# Patient Record
Sex: FEMALE | Race: WHITE | NOT HISPANIC OR LATINO | Employment: OTHER | URBAN - NONMETROPOLITAN AREA
[De-identification: names, ages, dates, MRNs, and addresses within clinical notes are randomized per-mention and may not be internally consistent; named-entity substitution may affect disease eponyms.]

---

## 2023-11-16 PROCEDURE — 93306 TTE W/DOPPLER COMPLETE: CPT | Performed by: INTERNAL MEDICINE

## 2023-11-20 ENCOUNTER — TELEPHONE (OUTPATIENT)
Dept: CARDIOLOGY | Facility: CLINIC | Age: 73
End: 2023-11-20
Payer: MEDICARE

## 2023-12-13 ENCOUNTER — TELEPHONE (OUTPATIENT)
Dept: CARDIOLOGY | Facility: CLINIC | Age: 73
End: 2023-12-13

## 2023-12-13 ENCOUNTER — OFFICE VISIT (OUTPATIENT)
Dept: CARDIOLOGY | Facility: CLINIC | Age: 73
End: 2023-12-13
Payer: MEDICARE

## 2023-12-13 VITALS
SYSTOLIC BLOOD PRESSURE: 180 MMHG | HEART RATE: 77 BPM | BODY MASS INDEX: 24.27 KG/M2 | HEIGHT: 63 IN | DIASTOLIC BLOOD PRESSURE: 80 MMHG | WEIGHT: 137 LBS

## 2023-12-13 DIAGNOSIS — Z87.74 STATUS POST AORTIC COARCTATION STENT PLACEMENT: ICD-10-CM

## 2023-12-13 DIAGNOSIS — I10 ESSENTIAL HYPERTENSION: ICD-10-CM

## 2023-12-13 DIAGNOSIS — Z95.828 STATUS POST AORTIC COARCTATION STENT PLACEMENT: ICD-10-CM

## 2023-12-13 DIAGNOSIS — R42 DIZZINESS: Primary | ICD-10-CM

## 2023-12-13 DIAGNOSIS — R07.9 CHEST PAIN, UNSPECIFIED TYPE: ICD-10-CM

## 2023-12-13 DIAGNOSIS — I35.0 AORTIC VALVE STENOSIS, ETIOLOGY OF CARDIAC VALVE DISEASE UNSPECIFIED: ICD-10-CM

## 2023-12-13 DIAGNOSIS — R42 DIZZINESS: ICD-10-CM

## 2023-12-13 PROCEDURE — 1159F MED LIST DOCD IN RCRD: CPT | Performed by: INTERNAL MEDICINE

## 2023-12-13 PROCEDURE — 3079F DIAST BP 80-89 MM HG: CPT | Performed by: INTERNAL MEDICINE

## 2023-12-13 PROCEDURE — 93000 ELECTROCARDIOGRAM COMPLETE: CPT | Performed by: INTERNAL MEDICINE

## 2023-12-13 PROCEDURE — 99205 OFFICE O/P NEW HI 60 MIN: CPT | Performed by: INTERNAL MEDICINE

## 2023-12-13 PROCEDURE — 1036F TOBACCO NON-USER: CPT | Performed by: INTERNAL MEDICINE

## 2023-12-13 PROCEDURE — 1160F RVW MEDS BY RX/DR IN RCRD: CPT | Performed by: INTERNAL MEDICINE

## 2023-12-13 PROCEDURE — 3077F SYST BP >= 140 MM HG: CPT | Performed by: INTERNAL MEDICINE

## 2023-12-13 RX ORDER — DICYCLOMINE HYDROCHLORIDE 20 MG/1
20 TABLET ORAL 2 TIMES DAILY PRN
COMMUNITY

## 2023-12-13 RX ORDER — METOPROLOL SUCCINATE 25 MG/1
25 TABLET, EXTENDED RELEASE ORAL DAILY
Qty: 30 TABLET | Refills: 11 | Status: SHIPPED | OUTPATIENT
Start: 2023-12-13 | End: 2024-12-12

## 2023-12-13 RX ORDER — LEVOTHYROXINE SODIUM 100 UG/1
100 TABLET ORAL
COMMUNITY

## 2023-12-13 RX ORDER — LISINOPRIL AND HYDROCHLOROTHIAZIDE 12.5; 2 MG/1; MG/1
1 TABLET ORAL DAILY
Status: ON HOLD | COMMUNITY
End: 2024-02-14 | Stop reason: SDUPTHER

## 2023-12-13 RX ORDER — FERROUS SULFATE 325(65) MG
2 TABLET, DELAYED RELEASE (ENTERIC COATED) ORAL DAILY
COMMUNITY

## 2023-12-13 ASSESSMENT — ENCOUNTER SYMPTOMS
LIGHT-HEADEDNESS: 1
LOSS OF BALANCE: 1
DIZZINESS: 1

## 2023-12-13 NOTE — PATIENT INSTRUCTIONS
Please bring all medicines, vitamins, and herbal supplements with you when you come to the office.    Prescriptions will not be filled unless you are compliant with your follow up appointments or have a follow up appointment scheduled as per instruction of your physician. Refills should be requested at the time of your visit.  Patient to be scheduled for Cardiac Cath with Dr. Ruben Pimentel, DO

## 2023-12-13 NOTE — PROGRESS NOTES
Cardiology Consultation- New Consult    Reason for referral: 73-year-old female seen in cardiology consultation at request of primary care nurse practitioner for abnormal echocardiogram symptoms of dizziness and lightheadedness and near syncope.  Patient had Lexiscan stress imaging and echocardiography performed at [facility], imaging is reviewed revealing severe aortic valve stenosis with peak aortic valve velocity of 4.08 m/s    Patient has 1 year history of increasing dizziness and unsteady gait and orthostatic changes with no true syncope.  She does describe episodic chest discomfort necessitating stress imaging that was normal    She denies any previous myocardial infarction, revascularization, stroke, thromboembolic or bleeding disorder.    Today's ECG is normal with normal sinus rhythm    She is treated for hypertension with 20/12.5 mg lisinopril/hydrochlorothiazide her blood pressure today is elevated at 180/80 and a heart rate of 77-80    Risks, benefits and alternatives and informed decision-making process performed for 30 minutes this morning in regards to aortic stenosis, inclusion criteria for TAVR/TAVR assessment and referral as well as the need for ruling out coronary artery disease by means of cardiac catheterization.    She has an excellent right radial pulse, we will proceed with left heart catheterization electively and then referral to structural heart team for further consideration of elective TAVR.    HPI: Neha Stroud is a 73 y.o. female       Past Medical History:   She has no past medical history on file.    Surgical History:   She has a past surgical history that includes Partial hysterectomy; Radical hysterectomy; Total knee arthroplasty (Bilateral); Rotator cuff repair (Right); Tonsillectomy; and Colonoscopy.    Family History:   Family History   Problem Relation Name Age of Onset    Heart disease Mother      Hypertension Mother      Lung cancer Father      Breast cancer Sister    "   Dementia Sister      Peripheral vascular disease Sister      Cervical cancer Sister      Atrial fibrillation Sister      Stroke Sister      Atrial fibrillation Brother      Throat cancer Brother         Social History:   Social History     Tobacco Use    Smoking status: Never    Smokeless tobacco: Never   Substance Use Topics    Alcohol use: Yes     Comment: occassionally        Allergies:  Patient has no known allergies.     Current Medications:    Current Outpatient Medications:     dicyclomine (Bentyl) 20 mg tablet, Take 1 tablet (20 mg) by mouth 4 times a day before meals., Disp: , Rfl:     ferrous sulfate 325 (65 Fe) MG EC tablet, Take 2 tablets by mouth once daily. Do not crush, chew, or split., Disp: , Rfl:     levothyroxine (Synthroid, Levoxyl) 100 mcg tablet, Take 1 tablet (100 mcg) by mouth once daily in the morning. Take before meals., Disp: , Rfl:     lisinopriL-hydrochlorothiazide 20-12.5 mg tablet, Take 1 tablet by mouth once daily., Disp: , Rfl:      Vitals:  Visit Vitals  /80 (BP Location: Right arm, Patient Position: Sitting)   Pulse 77   Ht 1.588 m (5' 2.5\")   Wt 62.1 kg (137 lb)   BMI 24.66 kg/m²   Smoking Status Never   BSA 1.66 m²     EKG done in office today      Review of Systems   Cardiovascular:  Positive for chest pain.   Neurological:  Positive for dizziness, light-headedness and loss of balance.   All other systems reviewed and are negative.      Objective         Physical Exam  Constitutional:       Appearance: Normal appearance. She is normal weight.   HENT:      Nose: Nose normal.   Neck:      Vascular: No carotid bruit.   Cardiovascular:      Rate and Rhythm: Normal rate.      Pulses: Normal pulses.      Heart sounds: Normal heart sounds.   Pulmonary:      Effort: Pulmonary effort is normal.   Abdominal:      General: Bowel sounds are normal.      Palpations: Abdomen is soft.   Genitourinary:     Rectum: Normal.   Musculoskeletal:         General: Normal range of motion.      " Cervical back: Normal range of motion.      Right lower leg: No edema.      Left lower leg: No edema.   Skin:     General: Skin is warm and dry.   Neurological:      General: No focal deficit present.      Mental Status: She is alert.   Psychiatric:         Mood and Affect: Mood normal.         Behavior: Behavior normal.         Thought Content: Thought content normal.         Judgment: Judgment normal.                Assessment and Plan:   1. Status post aortic coarctation stent placement        2. Chest pain, unspecified type        3. Essential hypertension        4. Dizziness        5. Aortic valve stenosis, etiology of cardiac valve disease unspecified           Scribe Attestation  By signing my name below, Maria R CARTER LPN , Scribe   attest that this documentation has been prepared under the direction and in the presence of Ruben Pimentel DO.

## 2023-12-15 DIAGNOSIS — E78.2 MIXED HYPERLIPIDEMIA: ICD-10-CM

## 2023-12-15 LAB
NON-UH HIE ANION GAP: 13.4 (ref 6–15)
NON-UH HIE BASOPHILS # (AUTO): 0.1 10*3/UL (ref 0–0.2)
NON-UH HIE BASOPHILS % (AUTO): 1.2 %
NON-UH HIE BLOOD UREA NITROGEN: 22 MG/DL (ref 7–25)
NON-UH HIE CARBON DIOXIDE: 23.9 MMOL/L (ref 21–31)
NON-UH HIE CHLORIDE: 98 MMOL/L (ref 98–107)
NON-UH HIE CHOL/HDL RATIO: 3.8
NON-UH HIE CHOLESTEROL: 255 MG/DL (ref 140–200)
NON-UH HIE CREATININE: 1.26 MG/DL (ref 0.6–1.2)
NON-UH HIE EOSINOPHILS # (AUTO): 0.4 10*3/UL (ref 0–0.45)
NON-UH HIE EOSINOPHILS % (AUTO): 6.9 %
NON-UH HIE ESTIMATED GFR: 44.52
NON-UH HIE HDL CHOLESTEROL: 68 MG/DL (ref 23–92)
NON-UH HIE HEMATOCRIT: 39 % (ref 34–46.4)
NON-UH HIE HEMOGLOBIN: 13.2 G/DL (ref 11.8–15.4)
NON-UH HIE INR: 0.9
NON-UH HIE LDL CHOLESTEROL,CALCULATED: 170 MG/DL (ref 0–100)
NON-UH HIE LYMPHOCYTES # (AUTO): 1.1 10*3/UL (ref 1–4.8)
NON-UH HIE LYMPHOCYTES % (AUTO): 19.2 %
NON-UH HIE MEAN CORPUSCULAR HEMOGLOBIN: 30.1 PG (ref 24.7–34.3)
NON-UH HIE MEAN CORPUSCULAR HGB CONC: 33.8 G/DL (ref 32–35)
NON-UH HIE MEAN CORPUSCULAR VOLUME: 88.8 FL (ref 80–100)
NON-UH HIE MEAN PLATELET VOLUME: 7.1 FL (ref 6.3–10.7)
NON-UH HIE MONOCYTES # (AUTO): 0.4 10*3/UL (ref 0–0.8)
NON-UH HIE MONOCYTES % (AUTO): 6.4 %
NON-UH HIE NEUTROPHILS # (AUTO): 3.7 10*3/UL (ref 1.8–7.7)
NON-UH HIE NEUTROPHILS % (AUTO): 66.3 %
NON-UH HIE NRBC%: 0 /100{WBC} (ref 0–0.5)
NON-UH HIE PARTIAL THROMBOPLASTIN TIME: 31.1 S (ref 25.1–36.5)
NON-UH HIE PLATELET COUNT: 293 10*3/UL (ref 150–450)
NON-UH HIE POTASSIUM: 4.3 MMOL/L (ref 3.5–5.1)
NON-UH HIE PROTHROMBIN TIME: 11 S (ref 9–12.9)
NON-UH HIE RED BLOOD COUNT: 4.39 (ref 3.6–5)
NON-UH HIE RED CELL DISTRIBUTION WIDTH: 13.7 % (ref 11.9–15.3)
NON-UH HIE SODIUM: 131 MMOL/L (ref 136–145)
NON-UH HIE TRIGLYCERIDE W/REFLEX: 84 MG/DL (ref 0–149)
NON-UH HIE UNCORRECTED WBC: 5.6 10*3/UL (ref 3.8–11.6)
NON-UH HIE VLDL CHOLESTEROL: 16 MG/DL
NON-UH HIE WHITE BLOOD COUNT: 5.6 10*3/UL (ref 3.8–11.6)

## 2023-12-15 RX ORDER — ATORVASTATIN CALCIUM 40 MG/1
40 TABLET, FILM COATED ORAL DAILY
Qty: 90 TABLET | Refills: 3 | Status: SHIPPED | OUTPATIENT
Start: 2023-12-15 | End: 2024-12-14

## 2023-12-15 NOTE — TELEPHONE ENCOUNTER
Result Communication    Resulted Orders   [facility] Complete Blood Count Auto Diff   Result Value Ref Range    [facility] White Blood Count 5.6 3.8 - 11.6 10*3/uL    [facility] Uncorrected WBC 5.6 3.8 - 11.6 10*3/uL    [facility] Red Blood Count 4.39 3.60 - 5.00    [facility] Hemoglobin 13.2 11.8 - 15.4 g/dL    [facility] Hematocrit 39.0 34.0 - 46.4 %    [facility] Mean Corpuscular Volume 88.8 80 - 100 fL    [facility] Mean Corpuscular Hemoglobin 30.1 24.7 - 34.3 pg    [facility] Mean Corpuscular HGB Conc 33.8 32.0 - 35.0 g/dL    [facility] Red Cell Distribution Width 13.7 11.9 - 15.3 %    [facility] Platelet Count 293 150 - 450 10*3/uL    [facility] Mean Platelet Volume 7.1 6.3 - 10.7 fL    [facility] Neutrophils % (Auto) 66.3 . %    [facility] Lymphocytes % (Auto) 19.2 . %    [facility] Monocytes % (Auto) 6.4 . %    [facility] Eosinophils % (Auto) 6.9 . %    [facility] Basophils % (Auto) 1.2 . %    [facility] NRBC% 0.0 0 - 0.5 /100[WBC]    [facility] Neutrophils # (Auto) 3.7 1.8 - 7.7 10*3/uL    [facility] Lymphocytes # (Auto) 1.1 1.00 - 4.8 10*3/uL    [facility] Monocytes # (Auto) 0.4 0.0 - 0.8 10*3/uL    [facility] Eosinophils # (Auto) 0.4 0.0 - 0.45 10*3/uL    [facility] Basophils # (Auto) 0.1 0.0 - 0.2 10*3/uL      Comment:      PERFORMED BY:[place] [id number] [place], [place] [id number] MEDICAL DIRECTORJIANLAN SUN M.D.   [facility] Coagulation Profile   Result Value Ref Range    [facility] Prothrombin Time 11.0 9.0 - 12.9 s      Comment:         A hematocrit value greater than 55% may lead to inaccurate   results in coagulation testing. Patients having hematocrit   values >55% require a special collection tube for   coagulation studies.   Please contact the laboratory at [phone] for redraw   instructions.    [facility] INR 0.9       Comment:         INR Therapeutic Range   A) Pre- and Peroperative OAT started two weeks before   surgery.                     NOT  HIP SURGERY:   1.5 - 2.5                                HIP SURGERY:        2  -  3   B) Primary and secondary prevention of venous                                THROMBOSIS:         2  -  3   C) Active venous thrombosis, pulmonary embolism   and prevention of recurrent venous thrombosis:   2  -  3      D) Prevention of arterial thromboembolism   including patients with mechanical heart valves: 3  - 4.5    [facility] Partial Thromboplastin Time 31.1 25.1 - 36.5 s      Comment:         A hematocrit value greater than 55% may lead to inaccurate   results in coagulation testing. Patients having hematocrit   values >55% require a special collection tube for   coagulation studies.   Please contact the laboratory at [phone] for redraw   instructions.PERFORMED BY:[place] [id number] [place], [place] [id number] MEDICAL DIRECTORKAMI RETANA M.D.   [facility] Electrolytes   Result Value Ref Range    [facility] Sodium 131 (L) 136 - 145 mmol/L    [facility] Potassium 4.3 3.5 - 5.1 mmol/L    [facility] Chloride 98 98 - 107 mmol/L    [facility] Carbon Dioxide 23.9 21.0 - 31.0 mmol/L    [facility] Anion Gap 13.4 6.0 - 15.0   [facility] Blood Urea Nitrogen   Result Value Ref Range    [facility] Blood Urea Nitrogen 22 7 - 25 mg/dL   [facility] Creatinine   Result Value Ref Range    [facility] Creatinine 1.26 (H) 0.60 - 1.20 mg/dL    [facility] ESTIMATED GFR 44.522    [facility] Lipid Panel   Result Value Ref Range    [facility] Cholesterol 255 (H) 140 - 200 mg/dL      Comment:         Chol less than 200 mg/dl      low risk   Chol 201-239 mg/dl            borderline risk   Chol 240 mg/dl and greater    high risk    [facility] HDL Cholesterol 68 23 - 92 mg/dL      Comment:         HDL CHOL ATP-III CLASSIFICATION                              Cardiovascular                                  Risk      HDL > or equal to 60 mg/dL     LOW   HDL < 40 mg/dL                 HIGH    [facility]  Triglyceride w/Reflex 84 0 - 149 mg/dL      Comment:         TRIG ATP III CLASSIFICATION   TRIG less than 150 mg/dL      Normal   TRIG 150-199 mg/dL            Borderline high   TRIG 200-500 mg/dL            High   TRIG greater than 500 mg/dL   Very high   Standard traceable to the Center for Disease Conrtrol and   Prevention (CDC) test method.    [facility] LDL Cholesterol,Calculated 170 (H) 0 - 100 mg/dL      Comment:         LDL ATP III CLASSIFICATION   LDL less than 100 mg/dL       Optimal   -129 mg/dL             Near or above optimal   -159 mg/dL             Borderline high   -189 mg/dL             High   LDL greater than 189 mg/dL    Very high    [facility] VLDL CHOLESTEROL 16 mg/dL    [facility] Chol/HDL Ratio 3.8 <5.0      Comment:      PERFORMED BY:[place] [id number] [place], [place] [id number] MEDICAL DIRECTORKAMI RETANA M.D.       4:25 PM      Results were successfully communicated with the patient and they acknowledged their understanding.

## 2023-12-15 NOTE — TELEPHONE ENCOUNTER
----- Message from Ruben Pimentel DO sent at 12/15/2023  3:42 PM EST -----  , initiate atorvastatin 40 daily please

## 2024-01-09 ENCOUNTER — TELEPHONE (OUTPATIENT)
Dept: CARDIOLOGY | Facility: CLINIC | Age: 74
End: 2024-01-09
Payer: MEDICARE

## 2024-01-09 NOTE — TELEPHONE ENCOUNTER
Patient called stating she passed out  while taking a shower today. States she had nausea prior to the occurrence.  Patient states only known injury is she did twist her knee.  She is waiting to hear from  regarding referral. She has not heard. Heart cath was mentioned at 12/13/2023 visit via dictation.     Reccommended she report to er for the occurrence of  of syncope.     To Dr. Jose Dougherty MD in Dr. Ruben Pimentel, DO absence.

## 2024-01-10 ENCOUNTER — TELEPHONE (OUTPATIENT)
Dept: CARDIOLOGY | Facility: CLINIC | Age: 74
End: 2024-01-10

## 2024-01-10 DIAGNOSIS — I35.0 NONRHEUMATIC AORTIC VALVE STENOSIS: ICD-10-CM

## 2024-01-10 NOTE — TELEPHONE ENCOUNTER
Patient had PCI on 12/18 with Dr. Ruben Pimentel DO and order was placed for referral. See new task.

## 2024-01-10 NOTE — TELEPHONE ENCOUNTER
Patient is being discharged from [facility] s/p cath on 12/18 structrual heart referral for TAVR with Dr. Newberry  Patient is aware referral being made.     Transferred to scheduling  to send to Structural Heart for TAVR once order signed.

## 2024-01-11 ENCOUNTER — TELEPHONE (OUTPATIENT)
Dept: CARDIOLOGY | Facility: HOSPITAL | Age: 74
End: 2024-01-11
Payer: MEDICARE

## 2024-01-11 NOTE — TELEPHONE ENCOUNTER
Received Structural Heart referral today for aortic stenosis. Called patient & left message to call office back.

## 2024-01-12 ENCOUNTER — TELEPHONE (OUTPATIENT)
Dept: CARDIOLOGY | Facility: HOSPITAL | Age: 74
End: 2024-01-12
Payer: MEDICARE

## 2024-01-17 DIAGNOSIS — I35.0 NONRHEUMATIC AORTIC VALVE STENOSIS: Primary | ICD-10-CM

## 2024-01-22 ENCOUNTER — OFFICE VISIT (OUTPATIENT)
Dept: CARDIAC SURGERY | Facility: HOSPITAL | Age: 74
End: 2024-01-22
Payer: MEDICARE

## 2024-01-22 ENCOUNTER — HOSPITAL ENCOUNTER (OUTPATIENT)
Dept: RADIOLOGY | Facility: HOSPITAL | Age: 74
Discharge: HOME | End: 2024-01-22
Payer: MEDICARE

## 2024-01-22 ENCOUNTER — OFFICE VISIT (OUTPATIENT)
Dept: CARDIOLOGY | Facility: HOSPITAL | Age: 74
End: 2024-01-22
Payer: MEDICARE

## 2024-01-22 VITALS
SYSTOLIC BLOOD PRESSURE: 130 MMHG | HEIGHT: 62 IN | OXYGEN SATURATION: 98 % | BODY MASS INDEX: 25.03 KG/M2 | WEIGHT: 136 LBS | DIASTOLIC BLOOD PRESSURE: 78 MMHG

## 2024-01-22 DIAGNOSIS — I35.0 NONRHEUMATIC AORTIC VALVE STENOSIS: ICD-10-CM

## 2024-01-22 DIAGNOSIS — I35.0 NONRHEUMATIC AORTIC VALVE STENOSIS: Primary | ICD-10-CM

## 2024-01-22 PROCEDURE — 1036F TOBACCO NON-USER: CPT | Performed by: THORACIC SURGERY (CARDIOTHORACIC VASCULAR SURGERY)

## 2024-01-22 PROCEDURE — 99214 OFFICE O/P EST MOD 30 MIN: CPT | Performed by: INTERNAL MEDICINE

## 2024-01-22 PROCEDURE — 3075F SYST BP GE 130 - 139MM HG: CPT | Performed by: INTERNAL MEDICINE

## 2024-01-22 PROCEDURE — 1159F MED LIST DOCD IN RCRD: CPT | Performed by: INTERNAL MEDICINE

## 2024-01-22 PROCEDURE — 1126F AMNT PAIN NOTED NONE PRSNT: CPT | Performed by: THORACIC SURGERY (CARDIOTHORACIC VASCULAR SURGERY)

## 2024-01-22 PROCEDURE — 71275 CT ANGIOGRAPHY CHEST: CPT | Performed by: STUDENT IN AN ORGANIZED HEALTH CARE EDUCATION/TRAINING PROGRAM

## 2024-01-22 PROCEDURE — 1036F TOBACCO NON-USER: CPT | Performed by: INTERNAL MEDICINE

## 2024-01-22 PROCEDURE — 2550000001 HC RX 255 CONTRASTS: Performed by: INTERNAL MEDICINE

## 2024-01-22 PROCEDURE — 99205 OFFICE O/P NEW HI 60 MIN: CPT | Performed by: THORACIC SURGERY (CARDIOTHORACIC VASCULAR SURGERY)

## 2024-01-22 PROCEDURE — 3078F DIAST BP <80 MM HG: CPT | Performed by: INTERNAL MEDICINE

## 2024-01-22 PROCEDURE — 1159F MED LIST DOCD IN RCRD: CPT | Performed by: THORACIC SURGERY (CARDIOTHORACIC VASCULAR SURGERY)

## 2024-01-22 PROCEDURE — 1126F AMNT PAIN NOTED NONE PRSNT: CPT | Performed by: INTERNAL MEDICINE

## 2024-01-22 PROCEDURE — 99204 OFFICE O/P NEW MOD 45 MIN: CPT | Performed by: INTERNAL MEDICINE

## 2024-01-22 PROCEDURE — 1160F RVW MEDS BY RX/DR IN RCRD: CPT | Performed by: THORACIC SURGERY (CARDIOTHORACIC VASCULAR SURGERY)

## 2024-01-22 PROCEDURE — 74174 CTA ABD&PLVS W/CONTRAST: CPT

## 2024-01-22 PROCEDURE — 99215 OFFICE O/P EST HI 40 MIN: CPT | Mod: 57 | Performed by: THORACIC SURGERY (CARDIOTHORACIC VASCULAR SURGERY)

## 2024-01-22 PROCEDURE — 74174 CTA ABD&PLVS W/CONTRAST: CPT | Performed by: STUDENT IN AN ORGANIZED HEALTH CARE EDUCATION/TRAINING PROGRAM

## 2024-01-22 RX ADMIN — IOHEXOL 80 ML: 350 INJECTION, SOLUTION INTRAVENOUS at 11:26

## 2024-01-22 ASSESSMENT — PAIN SCALES - GENERAL: PAINLEVEL: 0-NO PAIN

## 2024-01-22 NOTE — LETTER
January 22, 2024     Jose Dougherty MD  [address]  [facility] [address]    Patient: Neha Stroud   YOB: 1950   Date of Visit: 1/22/2024       Dear Dr. Jose Dougherty MD:    Thank you for referring Neha Stroud to me for evaluation. Below are my notes for this consultation.  If you have questions, please do not hesitate to call me. I look forward to following your patient along with you.       Sincerely,     Jeff Berry MD      CC: Loree Salmon, APRN-CNP  ______________________________________________________________________________________    Cardio: Farhat      HPI:   73-year-old lady with past medical history of CAD with PCI to RCA (12/23), hypertension, dyslipidemia, CKD and anemia.    The patient has progressive NYHA class II symptoms and fatigue.  Her echocardiogram shows severe aortic stenosis EF 60 to 65%, PV 4.08, MG 35, CONTRERAS 0.7.  The patient also had an episode of syncope while taking a shower last week for which she went to [facility].  She does report dizziness mostly while taking a shower but not necessarily with exertion.  She no longer has chest pain since she had the PCI.      Patient is independent in ADLs and IADLs, she does not use any assistance while walking.  She is referred to our clinic for severe symptomatic aortic stenosis          ROS:    Constitutional: fatigue  Eyes: no eyesight problems, no blurred vision, no diplopia, no eye pain, no purulent discharge from the eyes, eyes not red, no dryness of the eyes and no itching of the eyes.   ENT: no nosebleeds, no hearing loss, no tinnitus, no earache, no sore throat, no hoarseness, no swollen glands in the neck and no nasal discharge.   Cardiovascular: dyspnea on exertion, no chest pain  Respiratory: no chronic cough, not coughing up sputum, no wheezing that is consistent with asthma  Gastrointestinal: no change in bowel habits, no blood in stools, no diarrhea, no constipation, no  nausea, no vomiting, no abdominal pain, no signs and symptoms of ulcer disease, no alex colored stools and no intolerance to fatty foods.   Genitourinary: no hematuria,  no urinary frequency, no dysuria, no incontinence, no burning sensation during urination, no urinary hesitancy, no nocturia, no genital lesion, no testicular pain, urinary stream is not smaller and urinary stream does not start and stop.   Musculoskeletal: no arthralgias, no myalgias, no joint swelling, no joint stiffness, no muscle weakness, no back pain, no limb pain, no limb swelling and no difficulty walking.   Skin: no skin rashes, no change in skin color and pigmentation, no skin lesions and no skin lumps.   Neurological: no seizures, no frequent falls, no headaches, no dizziness, no tingling, no fainting and no limb weakness.   Psychiatric: no depression, not suicidal, no confusion, no memory lapses or loss, no anxiety, no personality change and no emotional problems.   Endocrine: no goiter, no thyroid disorder, no diabetes mellitus, no excessive thirst, no dry skin, no cold intolerance, no heat intolerance, no erectile dysfunction, no increased urinary frequency, no proptosis and no deepening of the voice.   Hematologic/Lymphatic: no bleeding issues.   All other systems have been reviewed and are negative for complaint.       TAVR Workup:   - NYHA: II  - Frailty: 0/5  - EKG: NSR, , QRS 96  - TTE: EF 60%, PV 4.08, MG 35, CONTRERAS 0.7  - CT TAVR: Pending  - LHC: 12/23 PCI proximal/mid RCA  - dental clearance: No reported dental issues, sees dentist every 6 months  - STS  Procedure Type: Isolated AVR  Perioperative Outcome Estimate %  Operative Mortality 2.13%  Morbidity & Mortality 6.28%  Stroke 0.936%  Renal Failure 1.06%  Reoperation 3.13%  Prolonged Ventilation 3.32%  Deep Sternal Wound Infection 0.044%  Long Hospital Stay (>14 days) 2.77%  Short Hospital Stay (<6 days)* 53.6%    Physical Exam:  Constitutional: alert and in no acute  "distress.   Eyes: no erythema, swelling or discharge from the eye .   ENT: no erythema, edema, exudate or lesions .   Neck: neck is supple, no JVD  Pulmonary: no increased work of breathing or signs of respiratory distress , lungs clear to auscultation. , normal percussion of chest  and chest palpation normal .   Cardiovascular: RRR, 3/6 GILDA RUSB,  no pitting edema, non-displaced PMI, no S3 or S4  Abdomen: abdomen non-tender, no masses  and no hepatomegaly .   Neurologic: non-focal neurologic examination.           12/13/2023    10:20 AM 12/13/2023    10:21 AM   Vitals   Systolic 184 180   Diastolic 102 80   Heart Rate 77    Height (in) 1.588 m (5' 2.5\")    Weight (lb) 137    BMI 24.66 kg/m2    BSA (m2) 1.65 m2    Visit Report Report Report        Current Outpatient Medications   Medication Instructions   • atorvastatin (LIPITOR) 40 mg, oral, Daily   • dicyclomine (BENTYL) 20 mg, oral, 4 times daily before meals and nightly   • ferrous sulfate 325 (65 Fe) MG EC tablet 2 tablets, oral, Daily, Do not crush, chew, or split.   • levothyroxine (SYNTHROID, LEVOXYL) 100 mcg, oral, Daily before breakfast   • lisinopriL-hydrochlorothiazide 20-12.5 mg tablet 1 tablet, oral, Daily   • metoprolol succinate XL (TOPROL-XL) 25 mg, oral, Daily, Do not crush or chew.        Impression:   73-year-old lady with past medical history of CAD with PCI to RCA (12/23), hypertension, dyslipidemia, CKD and anemia.    The patient has severe symptomatic aortic stenosis with class II heart failure symptoms.  She is referred to our clinic for further evaluation and treatment planning    Plan:   Proceed with CT TAVR to assess her anatomy    Overall patient might be a candidate for both SAVR versus TAVR      We will discuss this patient's case at our Valve Team meeting with representatives from Structural Heart and Cardiac Surgery. Our nurse navigators will contact patient with further diagnostic needs and formal plan.       Attestation signed by " Jeff Berry MD at 1/22/2024  4:58 PM:  I saw and evaluated the patient. I personally obtained the key and critical portions of the history and physical exam or was physically present for key and critical portions performed by the resident/fellow. I reviewed the resident/fellow's documentation and discussed the patient with the resident/fellow. I agree with the resident/fellow's medical decision making as documented in the note.    Mrs. Stroud is an 73 year old woman who is a patient of [facility]; Dr. Jose Dougherty is her cardiologist.  I have been asked to evaluate her for aortic stenosis.  Her symptoms include dyspnea as well as fatigue.  She had an episode of syncope in the shower last week.  She denies chest pain since her recent PCI.  Her imaging is consistent with severe aortic stenosis with an ejection fraction of 60-65%, aortic valve velocity of 4.0 m/s, and aortic valve area calculated 0.7 cm², mean aortic valve gradient of 35 mmHg.    We had a nice discussion regarding the indications for intervention on her valve disease, this included percutaneous as well as open surgical approaches.  Certainly a catheter-based approach is first-line therapy and in her best interest.  We discussed the specific goals of this procedure being relief of symptoms, preservation of left ventricular function, and prolonging life.  Specific risks discussed were vascular access site bleeding/complication, need for permanent pacemaker, and stroke.     In further evaluation we are obtaining a computed tomography-TAVR protocol.    She is a normal risk surgical patient, should there be any difficulty with vascular access, or aortic root anatomy/geometry she will be certainly an candidate for open cardiac surgery to replace her aortic valve.

## 2024-01-22 NOTE — PROGRESS NOTES
Cardio: Farhat      HPI:   73-year-old lady with past medical history of CAD with PCI to RCA (12/23), hypertension, dyslipidemia, CKD and anemia.    The patient has progressive NYHA class II symptoms and fatigue.  Her echocardiogram shows severe aortic stenosis EF 60 to 65%, PV 4.08, MG 35, CONTRERAS 0.7.  The patient also had an episode of syncope while taking a shower last week for which she went to [facility].  She does report dizziness mostly while taking a shower but not necessarily with exertion.  She no longer has chest pain since she had the PCI.      Patient is independent in ADLs and IADLs, she does not use any assistance while walking.  She is referred to our clinic for severe symptomatic aortic stenosis          ROS:    Constitutional: fatigue  Eyes: no eyesight problems, no blurred vision, no diplopia, no eye pain, no purulent discharge from the eyes, eyes not red, no dryness of the eyes and no itching of the eyes.   ENT: no nosebleeds, no hearing loss, no tinnitus, no earache, no sore throat, no hoarseness, no swollen glands in the neck and no nasal discharge.   Cardiovascular: dyspnea on exertion, no chest pain  Respiratory: no chronic cough, not coughing up sputum, no wheezing that is consistent with asthma  Gastrointestinal: no change in bowel habits, no blood in stools, no diarrhea, no constipation, no nausea, no vomiting, no abdominal pain, no signs and symptoms of ulcer disease, no alex colored stools and no intolerance to fatty foods.   Genitourinary: no hematuria,  no urinary frequency, no dysuria, no incontinence, no burning sensation during urination, no urinary hesitancy, no nocturia, no genital lesion, no testicular pain, urinary stream is not smaller and urinary stream does not start and stop.   Musculoskeletal: no arthralgias, no myalgias, no joint swelling, no joint stiffness, no muscle weakness, no back pain, no limb pain, no limb swelling and no difficulty walking.   Skin: no skin  rashes, no change in skin color and pigmentation, no skin lesions and no skin lumps.   Neurological: no seizures, no frequent falls, no headaches, no dizziness, no tingling, no fainting and no limb weakness.   Psychiatric: no depression, not suicidal, no confusion, no memory lapses or loss, no anxiety, no personality change and no emotional problems.   Endocrine: no goiter, no thyroid disorder, no diabetes mellitus, no excessive thirst, no dry skin, no cold intolerance, no heat intolerance, no erectile dysfunction, no increased urinary frequency, no proptosis and no deepening of the voice.   Hematologic/Lymphatic: no bleeding issues.   All other systems have been reviewed and are negative for complaint.       TAVR Workup:   - NYHA: II  - Frailty: 0/5  - EKG: NSR, , QRS 96  - TTE: EF 60%, PV 4.08, MG 35, CONTRERAS 0.7  - CT TAVR: Pending  - LHC: 12/23 PCI proximal/mid RCA  - dental clearance: No reported dental issues, sees dentist every 6 months  - STS  Procedure Type: Isolated AVR  Perioperative Outcome Estimate %  Operative Mortality 2.13%  Morbidity & Mortality 6.28%  Stroke 0.936%  Renal Failure 1.06%  Reoperation 3.13%  Prolonged Ventilation 3.32%  Deep Sternal Wound Infection 0.044%  Long Hospital Stay (>14 days) 2.77%  Short Hospital Stay (<6 days)* 53.6%    Physical Exam:  Constitutional: alert and in no acute distress.   Eyes: no erythema, swelling or discharge from the eye .   ENT: no erythema, edema, exudate or lesions .   Neck: neck is supple, no JVD  Pulmonary: no increased work of breathing or signs of respiratory distress , lungs clear to auscultation. , normal percussion of chest  and chest palpation normal .   Cardiovascular: RRR, 3/6 GILDA RUSB,  no pitting edema, non-displaced PMI, no S3 or S4  Abdomen: abdomen non-tender, no masses  and no hepatomegaly .   Neurologic: non-focal neurologic examination.           12/13/2023    10:20 AM 12/13/2023    10:21 AM   Vitals   Systolic 184 180   Diastolic  "102 80   Heart Rate 77    Height (in) 1.588 m (5' 2.5\")    Weight (lb) 137    BMI 24.66 kg/m2    BSA (m2) 1.65 m2    Visit Report Report Report        Current Outpatient Medications   Medication Instructions    atorvastatin (LIPITOR) 40 mg, oral, Daily    dicyclomine (BENTYL) 20 mg, oral, 4 times daily before meals and nightly    ferrous sulfate 325 (65 Fe) MG EC tablet 2 tablets, oral, Daily, Do not crush, chew, or split.    levothyroxine (SYNTHROID, LEVOXYL) 100 mcg, oral, Daily before breakfast    lisinopriL-hydrochlorothiazide 20-12.5 mg tablet 1 tablet, oral, Daily    metoprolol succinate XL (TOPROL-XL) 25 mg, oral, Daily, Do not crush or chew.        Impression:   73-year-old lady with past medical history of CAD with PCI to RCA (12/23), hypertension, dyslipidemia, CKD and anemia.    The patient has severe symptomatic aortic stenosis with class II heart failure symptoms.  She is referred to our clinic for further evaluation and treatment planning    Plan:   Proceed with CT TAVR to assess her anatomy    Overall patient might be a candidate for both SAVR versus TAVR      We will discuss this patient's case at our Valve Team meeting with representatives from Structural Heart and Cardiac Surgery. Our nurse navigators will contact patient with further diagnostic needs and formal plan.     "

## 2024-01-22 NOTE — PROGRESS NOTES
Cardio: Farhat      HPI:   73-year-old lady with past medical history of CAD with PCI to RCA (12/23), hypertension, dyslipidemia, CKD and anemia.    The patient has progressive NYHA class II symptoms and fatigue.  Her echocardiogram shows severe aortic stenosis EF 60 to 65%, PV 4.08, MG 35, CONTRERAS 0.7.  The patient also had an episode of syncope while taking a shower last week for which she went to [facility].  She does report dizziness mostly while taking a shower but not necessarily with exertion.  She no longer has chest pain since she had the PCI.      Patient is independent in ADLs and IADLs, she does not use any assistance while walking.  She is referred to our clinic for severe symptomatic aortic stenosis          ROS:    Constitutional: fatigue  Eyes: no eyesight problems, no blurred vision, no diplopia, no eye pain, no purulent discharge from the eyes, eyes not red, no dryness of the eyes and no itching of the eyes.   ENT: no nosebleeds, no hearing loss, no tinnitus, no earache, no sore throat, no hoarseness, no swollen glands in the neck and no nasal discharge.   Cardiovascular: dyspnea on exertion, no chest pain  Respiratory: no chronic cough, not coughing up sputum, no wheezing that is consistent with asthma  Gastrointestinal: no change in bowel habits, no blood in stools, no diarrhea, no constipation, no nausea, no vomiting, no abdominal pain, no signs and symptoms of ulcer disease, no alex colored stools and no intolerance to fatty foods.   Genitourinary: no hematuria,  no urinary frequency, no dysuria, no incontinence, no burning sensation during urination, no urinary hesitancy, no nocturia, no genital lesion, no testicular pain, urinary stream is not smaller and urinary stream does not start and stop.   Musculoskeletal: no arthralgias, no myalgias, no joint swelling, no joint stiffness, no muscle weakness, no back pain, no limb pain, no limb swelling and no difficulty walking.   Skin: no skin  I called and s/w pt's mom, ok per RODRIGUEZ, she states pt is now in [place] and went to urgent care to have inhibin B drawn, on day of draw, they were not able to get any blood, advised pt to drink plenty of water and go to [facility] in a couple of days,  They told her she had to have a new lab form and they gave her one but changed the codes to 2 other codes other than the original code we put on lab slip of C56.9. Mother states insurance is not paying for inhibin B like they have in the past.  I advised mom that the urgent care facility that gave her a new lab slip needs to fix the code. It needs to be C56.9. She will call office and have them correct. We also did not receive a copy of lab work, due to md name also was changed. She will call and have them fax over a copy, offered assistance in the future. rashes, no change in skin color and pigmentation, no skin lesions and no skin lumps.   Neurological: no seizures, no frequent falls, no headaches, no dizziness, no tingling, no fainting and no limb weakness.   Psychiatric: no depression, not suicidal, no confusion, no memory lapses or loss, no anxiety, no personality change and no emotional problems.   Endocrine: no goiter, no thyroid disorder, no diabetes mellitus, no excessive thirst, no dry skin, no cold intolerance, no heat intolerance, no erectile dysfunction, no increased urinary frequency, no proptosis and no deepening of the voice.   Hematologic/Lymphatic: no bleeding issues.   All other systems have been reviewed and are negative for complaint.       TAVR Workup:   - NYHA: II  - Frailty: 0/5  - EKG: NSR, , QRS 96  - TTE: EF 60%, PV 4.08, MG 35, CONRTERAS 0.7  - CT TAVR: Pending  - LHC: 12/23 PCI proximal/mid RCA  - dental clearance: No reported dental issues, sees dentist every 6 months  - STS  Procedure Type: Isolated AVR  Perioperative Outcome Estimate %  Operative Mortality 2.13%  Morbidity & Mortality 6.28%  Stroke 0.936%  Renal Failure 1.06%  Reoperation 3.13%  Prolonged Ventilation 3.32%  Deep Sternal Wound Infection 0.044%  Long Hospital Stay (>14 days) 2.77%  Short Hospital Stay (<6 days)* 53.6%    Physical Exam:  Constitutional: alert and in no acute distress.   Eyes: no erythema, swelling or discharge from the eye .   ENT: no erythema, edema, exudate or lesions .   Neck: neck is supple, no JVD  Pulmonary: no increased work of breathing or signs of respiratory distress , lungs clear to auscultation. , normal percussion of chest  and chest palpation normal .   Cardiovascular: RRR, 3/6 GILDA RUSB,  no pitting edema, non-displaced PMI, no S3 or S4  Abdomen: abdomen non-tender, no masses  and no hepatomegaly .   Neurologic: non-focal neurologic examination.           12/13/2023    10:20 AM 12/13/2023    10:21 AM   Vitals   Systolic 184 180   Diastolic  "102 80   Heart Rate 77    Height (in) 1.588 m (5' 2.5\")    Weight (lb) 137    BMI 24.66 kg/m2    BSA (m2) 1.65 m2    Visit Report Report Report        Current Outpatient Medications   Medication Instructions    atorvastatin (LIPITOR) 40 mg, oral, Daily    dicyclomine (BENTYL) 20 mg, oral, 4 times daily before meals and nightly    ferrous sulfate 325 (65 Fe) MG EC tablet 2 tablets, oral, Daily, Do not crush, chew, or split.    levothyroxine (SYNTHROID, LEVOXYL) 100 mcg, oral, Daily before breakfast    lisinopriL-hydrochlorothiazide 20-12.5 mg tablet 1 tablet, oral, Daily    metoprolol succinate XL (TOPROL-XL) 25 mg, oral, Daily, Do not crush or chew.        Impression:   73-year-old lady with past medical history of CAD with PCI to RCA (12/23), hypertension, dyslipidemia, CKD and anemia.    The patient has severe symptomatic aortic stenosis with class II heart failure symptoms.  She is referred to our clinic for further evaluation and treatment planning    Plan:   Proceed with CT TAVR to assess her anatomy    Overall patient might be a candidate for both SAVR versus TAVR      We will discuss this patient's case at our Valve Team meeting with representatives from Structural Heart and Cardiac Surgery. Our nurse navigators will contact patient with further diagnostic needs and formal plan.     "

## 2024-01-22 NOTE — PROGRESS NOTES
[facility]Q Questionnaire      1  Heart failure affects different people in different ways. Some feel shortness of breath while others feel fatigue. Please indicate how much you are limited by heart failure (shortness of breath or fatigue) in your ability to do the following activities over the past 2 weeks. PRE PROCEDURE    A.) Showering/bathing  5. Not at All  B.) Walking 1 block on level ground 3. Moderately  C.) Hurrying or Jogging   4. Slightly    2.  Over the past 2 weeks, how many times did you have swelling in your feet, ankles or legs when you woke up in the morning? 4. Less than once a week    3.  Over the past 2 weeks, on average, how many times has fatigue limited your ability to do what you wanted? 5. 1-2 times a week    4.  Over the past 2 weeks, on average, how many times has shortness of breath limited your ability to do what you wanted? 6. Less than once a week    5.  Over the past 2 weeks, on average, how many times have you been forced to sleep sitting up in a chair or with at least 3 pillows to prop you up because of shortness of breath? Less than once a week    6. Over the past 2 weeks, how much has your heart failure limited your enjoyment of life? It has slightly limited my enjoyment of life    7. If you had to spend the rest of your life with your heart failure the way it is right now, how would you feel about this? 3. Somewhat satisfied    8. How much does your heart failure affect your lifestyle? Please indicate how your heart failure may have limited yourparticipation in the following activities over the past 2 weeks    A.)  Hobbies, recreational activities  3. Moderately limited    B.) Working or doing household chores  3. Moderately limited    C.) Visiting family or friends out of your home  4. Slightly limited    5 Meter Walk______1:56____seconds

## 2024-01-29 ENCOUNTER — CARDIOLOGY CONFERENCE (OUTPATIENT)
Dept: CARDIOLOGY | Facility: HOSPITAL | Age: 74
End: 2024-01-29
Payer: MEDICARE

## 2024-01-29 DIAGNOSIS — I35.0 NONRHEUMATIC AORTIC VALVE STENOSIS: Primary | ICD-10-CM

## 2024-01-29 NOTE — PROGRESS NOTES
Case was reviewed in Valve and Structural heart team meeting. Plan to proceed with TAVR.    Access ok. Melanie [address] area 404  sinus 31/31/31 stj 27 Evolut 29 fx predil 20

## 2024-02-12 PROBLEM — Z95.2 S/P TAVR (TRANSCATHETER AORTIC VALVE REPLACEMENT): Status: ACTIVE | Noted: 2024-02-12

## 2024-02-12 NOTE — DISCHARGE INSTRUCTIONS
Medication changes    1 Reduce your Lisinopril/hydrochlorothiazide to 10/6.25mg Can cut current pill in half  2 Resume all other home meds starting tonight      ####  POST VALVE PROCEDURE DISCHARGE INSTRUCTIONS   ####    - Please weigh yourself daily (first thing in the morning) and record reading  - Please take and record your blood pressure 2 times a day (at least 60-90 mins AFTER you take your meds)  PLEASE HAVE THESE READINGS AVAILABLE DURING YOUR FOLLOW-UP APPOINTMENTS!!    - NO DRIVING OR LIFTING/PULLING/PUSHING GREATER THAN 10 POUNDS FOR 1 WEEK FROM YOUR PROCEDURE DATE.    - A lab requisition has been provided for you to draw a CBC, BMP, and PT/INR.  Please take this rec to your local lab to have your labs drawn between 4-7 days post discharge.     - You have been given the order requisition for the 1 month ECHO at the time of your discharge.  Please call and schedule this ECHO at your LOCAL center (no sooner than 23 days after your procedure date).    - You will have 3 appointments that are vital to your post procedure care, these will be scheduled for you IF YOU NEED TO CHANGE YOUR APPOINTMENT TIME/DATE, PLEASE CALL [phone]   - Please follow up with your PCP within 7-14 days of discharge  - You will follow up with your primary cardiologist in 6-10 weeks    **** No elective dental procedures or cleanings for 3 months post procedure - You will need dental prophylaxis (oral antibiotic) prior to dental work/cleanings for life *****    Please call the STRUCTURAL HEART TEAM LINE if you have any questions or concerns - [phone]     ****   CALL PROVIDER IF:   ****  - Breathing faster than normal.   - Breathing harder than normal or having retractions.   - Fever of 100.4 F (38 C) or higher.   - Chills.   - Drinking less than normal.   - Urinating less than normal, over 1 day.   - Acting very sleepy and difficult to awaken.   - Vomiting (throwing up) and not able to eat or drink for 12 hours.   - 3 or more  loose, watery bowel movements in 24 hours (diarrhea).    -Any new concerning symptoms.    - If you develop difficulty breathing, rash, hives, severe nausea, vomiting, light-headedness or any signs of infection, immediately contact your doctor and go to the nearest emergency room.      #####   MISC. HOME-GOING INFO   #####  - DO NOT drink any alcoholic drinks or take any non-prescriptive medications that contain alcohol for the first 24 hours.   - DO NOT make any important decisions for the first 24 hours.      ACTIVITY:  - You are advised to go directly home from the hospital.   - DO NOT lift anything heavier than 10 pounds for one week, this allows for proper healing of the groin.   - No excessive exercise or treadmill use for one week. You may walk and do stairs, slowly.   - No sexual activities for 24 hours after you arrive home.      WOUND CARE:  - If slight bleeding should occur at site, lie down and have someone apply firm pressure just above the puncture site for 5 minutes.  If it continues or is profuse, call 911. Always notify your doctor if bleeding occurs.   - Keep site clean and dry. Let air dry or you may use a simple bandaid.   - Gently cleanse the puncture site in your groin with soap and water only.   - You may experience some tenderness, bruising or minimal inflammation.  If you have any concerns, you may contact the Cath Lab or if any of these symptoms become excessive, contact your cardiologist or go to the emergency room.   - No tub baths, soaking, or swimming for one week.   - May shower the next day after your procedure.      DIET:  - You may resume your normal diet. However, be mindful of your sodium intake.  Ideally you should try to limit your daily intake of sodium to 2-3g a day      HEART FAILURE SPECIFIC INSTRUCTIONS:   - CALL 911 IF YOU HAVE ANY OF THE SIGNS AND SYMPTOMS OF HEART FAILURE:   1. Chest pain   2. Significant Shortness of breath   3. Fainting.   - Notify your physician  immediately if you have shortness of breath; weight gain of 3 lbs. or more; fatigue and loss of energy; swelling of lower extremities or abdomen; dizziness or fainting; change of appetite; and frequent coughing.   - Daily weight on the same scale, same time after voiding and before eating.   - Maintain daily weight log.

## 2024-02-13 ENCOUNTER — APPOINTMENT (OUTPATIENT)
Dept: RADIOLOGY | Facility: HOSPITAL | Age: 74
DRG: 267 | End: 2024-02-13
Payer: MEDICARE

## 2024-02-13 ENCOUNTER — APPOINTMENT (OUTPATIENT)
Dept: CARDIOLOGY | Facility: HOSPITAL | Age: 74
DRG: 267 | End: 2024-02-13
Payer: MEDICARE

## 2024-02-13 ENCOUNTER — HOSPITAL ENCOUNTER (INPATIENT)
Facility: HOSPITAL | Age: 74
LOS: 1 days | Discharge: HOME | DRG: 267 | End: 2024-02-14
Attending: INTERNAL MEDICINE | Admitting: INTERNAL MEDICINE
Payer: MEDICARE

## 2024-02-13 DIAGNOSIS — I35.0 AORTIC VALVE STENOSIS, ETIOLOGY OF CARDIAC VALVE DISEASE UNSPECIFIED: ICD-10-CM

## 2024-02-13 DIAGNOSIS — I35.0 NONRHEUMATIC AORTIC VALVE STENOSIS: ICD-10-CM

## 2024-02-13 DIAGNOSIS — Z95.2 S/P TAVR (TRANSCATHETER AORTIC VALVE REPLACEMENT): Primary | ICD-10-CM

## 2024-02-13 DIAGNOSIS — I10 ESSENTIAL HYPERTENSION: ICD-10-CM

## 2024-02-13 LAB
ANION GAP SERPL CALC-SCNC: 13 MMOL/L (ref 10–20)
ANION GAP SERPL CALC-SCNC: 13 MMOL/L (ref 10–20)
AORTIC VALVE MEAN GRADIENT: 30 MMHG
AORTIC VALVE PEAK VELOCITY: 3.62 M/S
APTT PPP: 34 SECONDS (ref 27–38)
AV PEAK GRADIENT: 52.4 MMHG
AVA (PEAK VEL): 0.95 CM2
AVA (VTI): 0.87 CM2
BASOPHILS # BLD AUTO: 0.03 X10*3/UL (ref 0–0.1)
BASOPHILS NFR BLD AUTO: 0.5 %
BUN SERPL-MCNC: 22 MG/DL (ref 6–23)
BUN SERPL-MCNC: 26 MG/DL (ref 6–23)
CALCIUM SERPL-MCNC: 8.6 MG/DL (ref 8.6–10.6)
CALCIUM SERPL-MCNC: 9.7 MG/DL (ref 8.6–10.6)
CHLORIDE SERPL-SCNC: 102 MMOL/L (ref 98–107)
CHLORIDE SERPL-SCNC: 99 MMOL/L (ref 98–107)
CO2 SERPL-SCNC: 23 MMOL/L (ref 21–32)
CO2 SERPL-SCNC: 26 MMOL/L (ref 21–32)
CREAT SERPL-MCNC: 0.88 MG/DL (ref 0.5–1.05)
CREAT SERPL-MCNC: 1.01 MG/DL (ref 0.5–1.05)
EGFRCR SERPLBLD CKD-EPI 2021: 58 ML/MIN/1.73M*2
EGFRCR SERPLBLD CKD-EPI 2021: 69 ML/MIN/1.73M*2
EJECTION FRACTION APICAL 4 CHAMBER: 62.1
EOSINOPHIL # BLD AUTO: 0.3 X10*3/UL (ref 0–0.4)
EOSINOPHIL NFR BLD AUTO: 5.5 %
ERYTHROCYTE [DISTWIDTH] IN BLOOD BY AUTOMATED COUNT: 13.8 % (ref 11.5–14.5)
ERYTHROCYTE [DISTWIDTH] IN BLOOD BY AUTOMATED COUNT: 14 % (ref 11.5–14.5)
GLUCOSE SERPL-MCNC: 78 MG/DL (ref 74–99)
GLUCOSE SERPL-MCNC: 84 MG/DL (ref 74–99)
HCT VFR BLD AUTO: 31.8 % (ref 36–46)
HCT VFR BLD AUTO: 38.7 % (ref 36–46)
HGB BLD-MCNC: 10.3 G/DL (ref 12–16)
HGB BLD-MCNC: 12.8 G/DL (ref 12–16)
IMM GRANULOCYTES # BLD AUTO: 0.01 X10*3/UL (ref 0–0.5)
IMM GRANULOCYTES NFR BLD AUTO: 0.2 % (ref 0–0.9)
INR PPP: 1 (ref 0.9–1.1)
INR PPP: 1.3 (ref 0.9–1.1)
LEFT ATRIUM VOLUME AREA LENGTH INDEX BSA: 44.8 ML/M2
LEFT VENTRICLE INTERNAL DIMENSION DIASTOLE: 3.8 CM (ref 3.5–6)
LEFT VENTRICULAR OUTFLOW TRACT DIAMETER: 2 CM
LYMPHOCYTES # BLD AUTO: 1.38 X10*3/UL (ref 0.8–3)
LYMPHOCYTES NFR BLD AUTO: 25.2 %
MAGNESIUM SERPL-MCNC: 1.62 MG/DL (ref 1.6–2.4)
MCH RBC QN AUTO: 29.9 PG (ref 26–34)
MCH RBC QN AUTO: 30.6 PG (ref 26–34)
MCHC RBC AUTO-ENTMCNC: 32.4 G/DL (ref 32–36)
MCHC RBC AUTO-ENTMCNC: 33.1 G/DL (ref 32–36)
MCV RBC AUTO: 92 FL (ref 80–100)
MCV RBC AUTO: 93 FL (ref 80–100)
MONOCYTES # BLD AUTO: 0.37 X10*3/UL (ref 0.05–0.8)
MONOCYTES NFR BLD AUTO: 6.8 %
NEUTROPHILS # BLD AUTO: 3.39 X10*3/UL (ref 1.6–5.5)
NEUTROPHILS NFR BLD AUTO: 61.8 %
NRBC BLD-RTO: 0 /100 WBCS (ref 0–0)
NRBC BLD-RTO: 0 /100 WBCS (ref 0–0)
PLATELET # BLD AUTO: 196 X10*3/UL (ref 150–450)
PLATELET # BLD AUTO: 248 X10*3/UL (ref 150–450)
POTASSIUM SERPL-SCNC: 4 MMOL/L (ref 3.5–5.3)
POTASSIUM SERPL-SCNC: 4 MMOL/L (ref 3.5–5.3)
PROTHROMBIN TIME: 10.7 SECONDS (ref 9.8–12.8)
PROTHROMBIN TIME: 15 SECONDS (ref 9.8–12.8)
RBC # BLD AUTO: 3.45 X10*6/UL (ref 4–5.2)
RBC # BLD AUTO: 4.18 X10*6/UL (ref 4–5.2)
SODIUM SERPL-SCNC: 134 MMOL/L (ref 136–145)
SODIUM SERPL-SCNC: 134 MMOL/L (ref 136–145)
WBC # BLD AUTO: 5.5 X10*3/UL (ref 4.4–11.3)
WBC # BLD AUTO: 7.3 X10*3/UL (ref 4.4–11.3)

## 2024-02-13 PROCEDURE — 99152 MOD SED SAME PHYS/QHP 5/>YRS: CPT | Performed by: INTERNAL MEDICINE

## 2024-02-13 PROCEDURE — 93005 ELECTROCARDIOGRAM TRACING: CPT

## 2024-02-13 PROCEDURE — 99153 MOD SED SAME PHYS/QHP EA: CPT | Performed by: INTERNAL MEDICINE

## 2024-02-13 PROCEDURE — 4A023N7 MEASUREMENT OF CARDIAC SAMPLING AND PRESSURE, LEFT HEART, PERCUTANEOUS APPROACH: ICD-10-PCS | Performed by: THORACIC SURGERY (CARDIOTHORACIC VASCULAR SURGERY)

## 2024-02-13 PROCEDURE — 85347 COAGULATION TIME ACTIVATED: CPT

## 2024-02-13 PROCEDURE — 93321 DOPPLER ECHO F-UP/LMTD STD: CPT | Performed by: INTERNAL MEDICINE

## 2024-02-13 PROCEDURE — 02RF38Z REPLACEMENT OF AORTIC VALVE WITH ZOOPLASTIC TISSUE, PERCUTANEOUS APPROACH: ICD-10-PCS | Performed by: THORACIC SURGERY (CARDIOTHORACIC VASCULAR SURGERY)

## 2024-02-13 PROCEDURE — 2500000002 HC RX 250 W HCPCS SELF ADMINISTERED DRUGS (ALT 637 FOR MEDICARE OP, ALT 636 FOR OP/ED): Performed by: NURSE PRACTITIONER

## 2024-02-13 PROCEDURE — 2720000007 HC OR 272 NO HCPCS: Performed by: INTERNAL MEDICINE

## 2024-02-13 PROCEDURE — C1760 CLOSURE DEV, VASC: HCPCS | Performed by: INTERNAL MEDICINE

## 2024-02-13 PROCEDURE — 33361 REPLACE AORTIC VALVE PERQ: CPT | Performed by: INTERNAL MEDICINE

## 2024-02-13 PROCEDURE — 2500000004 HC RX 250 GENERAL PHARMACY W/ HCPCS (ALT 636 FOR OP/ED): Performed by: NURSE PRACTITIONER

## 2024-02-13 PROCEDURE — 33361 REPLACE AORTIC VALVE PERQ: CPT | Performed by: THORACIC SURGERY (CARDIOTHORACIC VASCULAR SURGERY)

## 2024-02-13 PROCEDURE — 2500000001 HC RX 250 WO HCPCS SELF ADMINISTERED DRUGS (ALT 637 FOR MEDICARE OP): Performed by: NURSE PRACTITIONER

## 2024-02-13 PROCEDURE — G0269 OCCLUSIVE DEVICE IN VEIN ART: HCPCS | Mod: TC | Performed by: INTERNAL MEDICINE

## 2024-02-13 PROCEDURE — 2500000005 HC RX 250 GENERAL PHARMACY W/O HCPCS: Performed by: INTERNAL MEDICINE

## 2024-02-13 PROCEDURE — 93325 DOPPLER ECHO COLOR FLOW MAPG: CPT | Performed by: INTERNAL MEDICINE

## 2024-02-13 PROCEDURE — C1894 INTRO/SHEATH, NON-LASER: HCPCS | Performed by: INTERNAL MEDICINE

## 2024-02-13 PROCEDURE — 80048 BASIC METABOLIC PNL TOTAL CA: CPT | Performed by: INTERNAL MEDICINE

## 2024-02-13 PROCEDURE — 2500000004 HC RX 250 GENERAL PHARMACY W/ HCPCS (ALT 636 FOR OP/ED): Performed by: INTERNAL MEDICINE

## 2024-02-13 PROCEDURE — 93308 TTE F-UP OR LMTD: CPT | Performed by: INTERNAL MEDICINE

## 2024-02-13 PROCEDURE — 71045 X-RAY EXAM CHEST 1 VIEW: CPT

## 2024-02-13 PROCEDURE — 71045 X-RAY EXAM CHEST 1 VIEW: CPT | Performed by: RADIOLOGY

## 2024-02-13 PROCEDURE — C1725 CATH, TRANSLUMIN NON-LASER: HCPCS | Performed by: INTERNAL MEDICINE

## 2024-02-13 PROCEDURE — 93325 DOPPLER ECHO COLOR FLOW MAPG: CPT

## 2024-02-13 PROCEDURE — 93010 ELECTROCARDIOGRAM REPORT: CPT | Performed by: INTERNAL MEDICINE

## 2024-02-13 PROCEDURE — C1769 GUIDE WIRE: HCPCS | Performed by: INTERNAL MEDICINE

## 2024-02-13 PROCEDURE — 92953 TEMPORARY EXTERNAL PACING: CPT | Performed by: INTERNAL MEDICINE

## 2024-02-13 PROCEDURE — 36415 COLL VENOUS BLD VENIPUNCTURE: CPT | Performed by: INTERNAL MEDICINE

## 2024-02-13 PROCEDURE — 85025 COMPLETE CBC W/AUTO DIFF WBC: CPT | Performed by: NURSE PRACTITIONER

## 2024-02-13 PROCEDURE — C1756 CATH, PACING, TRANSESOPH: HCPCS | Performed by: INTERNAL MEDICINE

## 2024-02-13 PROCEDURE — 1200000002 HC GENERAL ROOM WITH TELEMETRY DAILY

## 2024-02-13 PROCEDURE — 80048 BASIC METABOLIC PNL TOTAL CA: CPT | Performed by: NURSE PRACTITIONER

## 2024-02-13 PROCEDURE — 2550000001 HC RX 255 CONTRASTS: Performed by: INTERNAL MEDICINE

## 2024-02-13 PROCEDURE — C1889 IMPLANT/INSERT DEVICE, NOC: HCPCS | Performed by: INTERNAL MEDICINE

## 2024-02-13 PROCEDURE — 2500000004 HC RX 250 GENERAL PHARMACY W/ HCPCS (ALT 636 FOR OP/ED): Mod: JG | Performed by: NURSE PRACTITIONER

## 2024-02-13 PROCEDURE — 85027 COMPLETE CBC AUTOMATED: CPT | Performed by: INTERNAL MEDICINE

## 2024-02-13 PROCEDURE — 2780000003 HC OR 278 NO HCPCS: Performed by: INTERNAL MEDICINE

## 2024-02-13 PROCEDURE — 2500000001 HC RX 250 WO HCPCS SELF ADMINISTERED DRUGS (ALT 637 FOR MEDICARE OP): Performed by: INTERNAL MEDICINE

## 2024-02-13 PROCEDURE — 85347 COAGULATION TIME ACTIVATED: CPT | Performed by: INTERNAL MEDICINE

## 2024-02-13 PROCEDURE — 85610 PROTHROMBIN TIME: CPT | Performed by: INTERNAL MEDICINE

## 2024-02-13 PROCEDURE — 85610 PROTHROMBIN TIME: CPT | Performed by: NURSE PRACTITIONER

## 2024-02-13 PROCEDURE — 83735 ASSAY OF MAGNESIUM: CPT | Performed by: NURSE PRACTITIONER

## 2024-02-13 PROCEDURE — 36415 COLL VENOUS BLD VENIPUNCTURE: CPT | Performed by: NURSE PRACTITIONER

## 2024-02-13 DEVICE — VLV EVOLUTFX-29 COMM US
Type: IMPLANTABLE DEVICE | Site: HEART | Status: FUNCTIONAL
Brand: EVOLUT™ FX

## 2024-02-13 DEVICE — LOADING SYS L-EVOLUTFX-2329
Type: IMPLANTABLE DEVICE | Site: HEART | Status: NON-FUNCTIONAL
Brand: EVOLUT™ FX

## 2024-02-13 RX ORDER — MIDAZOLAM HYDROCHLORIDE 1 MG/ML
INJECTION INTRAMUSCULAR; INTRAVENOUS AS NEEDED
Status: DISCONTINUED | OUTPATIENT
Start: 2024-02-13 | End: 2024-02-13 | Stop reason: HOSPADM

## 2024-02-13 RX ORDER — PHENYLEPHRINE HCL IN 0.9% NACL 1 MG/10 ML
200 SYRINGE (ML) INTRAVENOUS ONCE
Status: COMPLETED | OUTPATIENT
Start: 2024-02-13 | End: 2024-02-13

## 2024-02-13 RX ORDER — LEVOTHYROXINE SODIUM 100 UG/1
100 TABLET ORAL
Status: DISCONTINUED | OUTPATIENT
Start: 2024-02-14 | End: 2024-02-14 | Stop reason: HOSPADM

## 2024-02-13 RX ORDER — TRAMADOL HYDROCHLORIDE 50 MG/1
50 TABLET ORAL EVERY 6 HOURS PRN
Status: DISCONTINUED | OUTPATIENT
Start: 2024-02-13 | End: 2024-02-14 | Stop reason: HOSPADM

## 2024-02-13 RX ORDER — LIDOCAINE HYDROCHLORIDE AND EPINEPHRINE 10; 10 MG/ML; UG/ML
INJECTION, SOLUTION INFILTRATION; PERINEURAL AS NEEDED
Status: DISCONTINUED | OUTPATIENT
Start: 2024-02-13 | End: 2024-02-13 | Stop reason: HOSPADM

## 2024-02-13 RX ORDER — ACETAMINOPHEN 500 MG
1-2 TABLET ORAL EVERY 6 HOURS PRN
COMMUNITY

## 2024-02-13 RX ORDER — FENTANYL CITRATE 50 UG/ML
INJECTION, SOLUTION INTRAMUSCULAR; INTRAVENOUS AS NEEDED
Status: DISCONTINUED | OUTPATIENT
Start: 2024-02-13 | End: 2024-02-13 | Stop reason: HOSPADM

## 2024-02-13 RX ORDER — ACETAMINOPHEN 325 MG/1
650 TABLET ORAL EVERY 6 HOURS PRN
Status: DISCONTINUED | OUTPATIENT
Start: 2024-02-13 | End: 2024-02-14 | Stop reason: HOSPADM

## 2024-02-13 RX ORDER — PROCHLORPERAZINE EDISYLATE 5 MG/ML
10 INJECTION INTRAMUSCULAR; INTRAVENOUS EVERY 6 HOURS PRN
Status: DISCONTINUED | OUTPATIENT
Start: 2024-02-13 | End: 2024-02-14 | Stop reason: HOSPADM

## 2024-02-13 RX ORDER — LIDOCAINE HYDROCHLORIDE 20 MG/ML
INJECTION, SOLUTION INFILTRATION; PERINEURAL AS NEEDED
Status: DISCONTINUED | OUTPATIENT
Start: 2024-02-13 | End: 2024-02-13 | Stop reason: HOSPADM

## 2024-02-13 RX ORDER — PANTOPRAZOLE SODIUM 40 MG/1
40 TABLET, DELAYED RELEASE ORAL
Status: DISCONTINUED | OUTPATIENT
Start: 2024-02-14 | End: 2024-02-14 | Stop reason: HOSPADM

## 2024-02-13 RX ORDER — DOCUSATE SODIUM 100 MG/1
100 CAPSULE, LIQUID FILLED ORAL 2 TIMES DAILY
Status: DISCONTINUED | OUTPATIENT
Start: 2024-02-13 | End: 2024-02-14 | Stop reason: HOSPADM

## 2024-02-13 RX ORDER — PROCHLORPERAZINE 25 MG/1
25 SUPPOSITORY RECTAL EVERY 12 HOURS PRN
Status: DISCONTINUED | OUTPATIENT
Start: 2024-02-13 | End: 2024-02-14 | Stop reason: HOSPADM

## 2024-02-13 RX ORDER — ATROPINE SULFATE 0.1 MG/ML
0.5 INJECTION INTRAVENOUS ONCE
Status: COMPLETED | OUTPATIENT
Start: 2024-02-13 | End: 2024-02-13

## 2024-02-13 RX ORDER — DICYCLOMINE HYDROCHLORIDE 20 MG/1
20 TABLET ORAL 2 TIMES DAILY PRN
Status: DISCONTINUED | OUTPATIENT
Start: 2024-02-13 | End: 2024-02-14 | Stop reason: HOSPADM

## 2024-02-13 RX ORDER — ASPIRIN 81 MG/1
81 TABLET ORAL DAILY
COMMUNITY

## 2024-02-13 RX ORDER — TICAGRELOR 90 MG/1
90 TABLET ORAL 2 TIMES DAILY
COMMUNITY
Start: 2023-12-19

## 2024-02-13 RX ORDER — PROTAMINE SULFATE 10 MG/ML
INJECTION, SOLUTION INTRAVENOUS CONTINUOUS PRN
Status: COMPLETED | OUTPATIENT
Start: 2024-02-13 | End: 2024-02-13

## 2024-02-13 RX ORDER — CEFAZOLIN SODIUM 2 G/100ML
2 INJECTION, SOLUTION INTRAVENOUS ONCE
Status: COMPLETED | OUTPATIENT
Start: 2024-02-13 | End: 2024-02-13

## 2024-02-13 RX ORDER — SODIUM CHLORIDE, SODIUM LACTATE, POTASSIUM CHLORIDE, CALCIUM CHLORIDE 600; 310; 30; 20 MG/100ML; MG/100ML; MG/100ML; MG/100ML
75 INJECTION, SOLUTION INTRAVENOUS CONTINUOUS
Status: DISCONTINUED | OUTPATIENT
Start: 2024-02-13 | End: 2024-02-14 | Stop reason: HOSPADM

## 2024-02-13 RX ORDER — HEPARIN SODIUM 1000 [USP'U]/ML
INJECTION, SOLUTION INTRAVENOUS; SUBCUTANEOUS AS NEEDED
Status: DISCONTINUED | OUTPATIENT
Start: 2024-02-13 | End: 2024-02-13 | Stop reason: HOSPADM

## 2024-02-13 RX ORDER — ONDANSETRON HYDROCHLORIDE 2 MG/ML
4 INJECTION, SOLUTION INTRAVENOUS ONCE
Status: COMPLETED | OUTPATIENT
Start: 2024-02-13 | End: 2024-02-13

## 2024-02-13 RX ORDER — ASPIRIN 81 MG/1
81 TABLET ORAL DAILY
Status: DISCONTINUED | OUTPATIENT
Start: 2024-02-13 | End: 2024-02-14 | Stop reason: HOSPADM

## 2024-02-13 RX ORDER — ACETAMINOPHEN 650 MG/1
650 SUPPOSITORY RECTAL EVERY 6 HOURS PRN
Status: DISCONTINUED | OUTPATIENT
Start: 2024-02-13 | End: 2024-02-14 | Stop reason: HOSPADM

## 2024-02-13 RX ORDER — ACETAMINOPHEN 160 MG/5ML
650 SOLUTION ORAL EVERY 6 HOURS PRN
Status: DISCONTINUED | OUTPATIENT
Start: 2024-02-13 | End: 2024-02-14 | Stop reason: HOSPADM

## 2024-02-13 RX ORDER — PROCHLORPERAZINE MALEATE 10 MG
10 TABLET ORAL EVERY 6 HOURS PRN
Status: DISCONTINUED | OUTPATIENT
Start: 2024-02-13 | End: 2024-02-14 | Stop reason: HOSPADM

## 2024-02-13 RX ORDER — ASPIRIN 325 MG
TABLET ORAL AS NEEDED
Status: DISCONTINUED | OUTPATIENT
Start: 2024-02-13 | End: 2024-02-13 | Stop reason: HOSPADM

## 2024-02-13 RX ORDER — ATORVASTATIN CALCIUM 40 MG/1
40 TABLET, FILM COATED ORAL DAILY
Status: DISCONTINUED | OUTPATIENT
Start: 2024-02-14 | End: 2024-02-14 | Stop reason: HOSPADM

## 2024-02-13 RX ORDER — PANTOPRAZOLE SODIUM 40 MG/10ML
40 INJECTION, POWDER, LYOPHILIZED, FOR SOLUTION INTRAVENOUS
Status: DISCONTINUED | OUTPATIENT
Start: 2024-02-14 | End: 2024-02-14 | Stop reason: HOSPADM

## 2024-02-13 RX ORDER — RIZATRIPTAN BENZOATE 10 MG/1
10 TABLET ORAL DAILY PRN
COMMUNITY
Start: 2024-01-16

## 2024-02-13 RX ORDER — PHENYLEPHRINE HCL IN 0.9% NACL 1 MG/10 ML
SYRINGE (ML) INTRAVENOUS AS NEEDED
Status: DISCONTINUED | OUTPATIENT
Start: 2024-02-13 | End: 2024-02-13 | Stop reason: HOSPADM

## 2024-02-13 RX ADMIN — Medication 200 MCG: at 13:15

## 2024-02-13 RX ADMIN — TRAMADOL HYDROCHLORIDE 50 MG: 50 TABLET, COATED ORAL at 19:29

## 2024-02-13 RX ADMIN — ONDANSETRON 4 MG: 2 INJECTION, SOLUTION INTRAMUSCULAR; INTRAVENOUS at 13:15

## 2024-02-13 RX ADMIN — TICAGRELOR 90 MG: 90 TABLET ORAL at 20:47

## 2024-02-13 RX ADMIN — SODIUM CHLORIDE, SODIUM LACTATE, POTASSIUM CHLORIDE, AND CALCIUM CHLORIDE 500 ML: 600; 310; 30; 20 INJECTION, SOLUTION INTRAVENOUS at 13:15

## 2024-02-13 RX ADMIN — DOCUSATE SODIUM 100 MG: 100 CAPSULE, LIQUID FILLED ORAL at 20:46

## 2024-02-13 RX ADMIN — ATROPINE SULFATE 0.5 MG: 0.1 INJECTION, SOLUTION INTRAVENOUS at 13:15

## 2024-02-13 RX ADMIN — SODIUM CHLORIDE, POTASSIUM CHLORIDE, SODIUM LACTATE AND CALCIUM CHLORIDE 75 ML/HR: 600; 310; 30; 20 INJECTION, SOLUTION INTRAVENOUS at 15:30

## 2024-02-13 SDOH — SOCIAL STABILITY: SOCIAL INSECURITY: ARE YOU OR HAVE YOU BEEN THREATENED OR ABUSED PHYSICALLY, EMOTIONALLY, OR SEXUALLY BY ANYONE?: NO

## 2024-02-13 SDOH — SOCIAL STABILITY: SOCIAL INSECURITY: DO YOU FEEL UNSAFE GOING BACK TO THE PLACE WHERE YOU ARE LIVING?: NO

## 2024-02-13 SDOH — SOCIAL STABILITY: SOCIAL INSECURITY: HAS ANYONE EVER THREATENED TO HURT YOUR FAMILY OR YOUR PETS?: NO

## 2024-02-13 SDOH — SOCIAL STABILITY: SOCIAL INSECURITY: HAVE YOU HAD THOUGHTS OF HARMING ANYONE ELSE?: NO

## 2024-02-13 SDOH — SOCIAL STABILITY: SOCIAL INSECURITY: WERE YOU ABLE TO COMPLETE ALL THE BEHAVIORAL HEALTH SCREENINGS?: YES

## 2024-02-13 SDOH — SOCIAL STABILITY: SOCIAL INSECURITY: ARE THERE ANY APPARENT SIGNS OF INJURIES/BEHAVIORS THAT COULD BE RELATED TO ABUSE/NEGLECT?: NO

## 2024-02-13 SDOH — SOCIAL STABILITY: SOCIAL INSECURITY: ABUSE: ADULT

## 2024-02-13 SDOH — SOCIAL STABILITY: SOCIAL INSECURITY: DO YOU FEEL ANYONE HAS EXPLOITED OR TAKEN ADVANTAGE OF YOU FINANCIALLY OR OF YOUR PERSONAL PROPERTY?: NO

## 2024-02-13 SDOH — SOCIAL STABILITY: SOCIAL INSECURITY: DOES ANYONE TRY TO KEEP YOU FROM HAVING/CONTACTING OTHER FRIENDS OR DOING THINGS OUTSIDE YOUR HOME?: NO

## 2024-02-13 ASSESSMENT — COGNITIVE AND FUNCTIONAL STATUS - GENERAL
MOBILITY SCORE: 24
MOBILITY SCORE: 24
DAILY ACTIVITIY SCORE: 24
DAILY ACTIVITIY SCORE: 24
PATIENT BASELINE BEDBOUND: NO

## 2024-02-13 ASSESSMENT — ACTIVITIES OF DAILY LIVING (ADL)
WALKS IN HOME: INDEPENDENT
DRESSING YOURSELF: INDEPENDENT
JUDGMENT_ADEQUATE_SAFELY_COMPLETE_DAILY_ACTIVITIES: YES
BATHING: INDEPENDENT
TOILETING: INDEPENDENT
PATIENT'S MEMORY ADEQUATE TO SAFELY COMPLETE DAILY ACTIVITIES?: YES
FEEDING YOURSELF: INDEPENDENT
ADEQUATE_TO_COMPLETE_ADL: YES
LACK_OF_TRANSPORTATION: NO
HEARING - RIGHT EAR: FUNCTIONAL
HEARING - LEFT EAR: FUNCTIONAL
GROOMING: INDEPENDENT

## 2024-02-13 ASSESSMENT — COLUMBIA-SUICIDE SEVERITY RATING SCALE - C-SSRS
2. HAVE YOU ACTUALLY HAD ANY THOUGHTS OF KILLING YOURSELF?: NO
6. HAVE YOU EVER DONE ANYTHING, STARTED TO DO ANYTHING, OR PREPARED TO DO ANYTHING TO END YOUR LIFE?: NO
1. IN THE PAST MONTH, HAVE YOU WISHED YOU WERE DEAD OR WISHED YOU COULD GO TO SLEEP AND NOT WAKE UP?: NO

## 2024-02-13 ASSESSMENT — PATIENT HEALTH QUESTIONNAIRE - PHQ9
2. FEELING DOWN, DEPRESSED OR HOPELESS: NOT AT ALL
SUM OF ALL RESPONSES TO PHQ9 QUESTIONS 1 & 2: 0
1. LITTLE INTEREST OR PLEASURE IN DOING THINGS: NOT AT ALL

## 2024-02-13 ASSESSMENT — LIFESTYLE VARIABLES
SKIP TO QUESTIONS 9-10: 1
HOW OFTEN DO YOU HAVE A DRINK CONTAINING ALCOHOL: 2-3 TIMES A WEEK
AUDIT-C TOTAL SCORE: 3
AUDIT-C TOTAL SCORE: 3
HOW MANY STANDARD DRINKS CONTAINING ALCOHOL DO YOU HAVE ON A TYPICAL DAY: 1 OR 2
HOW OFTEN DO YOU HAVE 6 OR MORE DRINKS ON ONE OCCASION: NEVER

## 2024-02-13 ASSESSMENT — PAIN - FUNCTIONAL ASSESSMENT
PAIN_FUNCTIONAL_ASSESSMENT: 0-10

## 2024-02-13 ASSESSMENT — PAIN SCALES - GENERAL
PAINLEVEL_OUTOF10: 6
PAINLEVEL_OUTOF10: 4
PAINLEVEL_OUTOF10: 0 - NO PAIN

## 2024-02-13 NOTE — PROGRESS NOTES
Pharmacy Medication History Review    Neha Stroud is a 73 y.o. female admitted for Aortic stenosis. Pharmacy reviewed the patient's [id number] medications and allergies for accuracy.    The list below reflects the updated PTA list. Comments regarding how patient may be taking medications differently can be found in the Admit Orders Activity  Prior to Admission Medications   Prescriptions Last Dose Informant Patient Reported?   Brilinta 90 mg tablet 2/13/2024 Self Yes   Sig: Take 1 tablet (90 mg) by mouth 2 times a day.   acetaminophen (Tylenol) 500 mg tablet  Self Yes   Sig: Take 1-2 tablets (500-1,000 mg) by mouth every 6 hours if needed for mild pain (1 - 3).   aspirin 81 mg EC tablet 2/13/2024 Self Yes   Sig: Take 1 tablet (81 mg) by mouth once daily.   atorvastatin (Lipitor) 40 mg tablet 2/13/2024 Self No   Sig: Take 1 tablet (40 mg) by mouth once daily.   dicyclomine (Bentyl) 20 mg tablet Past Week Self Yes   Sig: Take 1 tablet (20 mg) by mouth 2 times a day as needed (stomach cramps).   ferrous sulfate 325 (65 Fe) MG EC tablet 2/13/2024 Self Yes   Sig: Take 2 tablets by mouth once daily. Do not crush, chew, or split.   levothyroxine (Synthroid, Levoxyl) 100 mcg tablet 2/13/2024 Self Yes   Sig: Take 1 tablet (100 mcg) by mouth once daily in the morning. Take before meals.   lisinopriL-hydrochlorothiazide 20-12.5 mg tablet Unknown Self Yes   Sig: Take 1 tablet by mouth once daily.   metoprolol succinate XL (Toprol-XL) 25 mg 24 hr tablet 2/13/2024 Self No   Sig: Take 1 tablet (25 mg) by mouth once daily. Do not crush or chew.   rizatriptan (Maxalt) 10 mg tablet Past Week Self Yes   Sig: Take 1 tablet (10 mg) by mouth once daily as needed for migraine. May repeat in 2 hours if needed      Facility-Administered Medications: None        The list below reflects the updated allergy list. Please review each documented allergy for additional clarification and justification.  Allergies  Reviewed by Vicenta GAINES  Enma litzy on 2/13/2024   No Known Allergies         Meds to Beds service not offered prior to procedure, Please reassess prior to patient discharge if Meds to Beds is desired. Pharmacy has been updated to [facility], [place].    Sources used to confirm home medication list include: Patient interview, [facility], Care Everywhere, medication fill history.    Aspirin:  Aspirin 81 mg once daily  Statin:  Atorvastatin 40 mg once daily  P2Y12 inhibitor:  Brilinta 90 mg twice a day  Anticoagulant:  None    Below are additional concerns with the patient's PTA list.  None to note.    Vicenta Norwood [facility]   Transitions of Care Pharmacist  [facility]  Please reach out via Secure Chat for questions, or if no response call [facility] or [facility]

## 2024-02-13 NOTE — Clinical Note
Sheath was exchanged in the right internal jugular vein with ACCESS KIT, MINI HO, 4 FR X 10CM, 0.018 X 40CM, NITINOL/PLATINUM, ECHO TIP.

## 2024-02-13 NOTE — Clinical Note
Temporary pacemaker turned on. Temporary pacemaker location through right internal jugular vein. Heart rate: 120. Current 20 (mA).

## 2024-02-13 NOTE — NURSING NOTE
Patient post-TAVR returned to recovery area, initially without complaint, VSS stable. Became nauseated, (?) vagal response. Transiently bradycardic, hypotensive. Fellow and Dr Newberry to bedside. EKG done, meds given, fluid bolus given. Echo called for post-eval. Patient recovered quickly, symptoms now resolving, VSS back to baseline.

## 2024-02-13 NOTE — H&P
HPI:   73-year-old lady with past medical history of CAD with PCI to RCA (12/23), hypertension, dyslipidemia, CKD and anemia.     The patient has progressive NYHA class II symptoms and fatigue.  Her echocardiogram shows severe aortic stenosis EF 60 to 65%, PV 4.08, MG 35, CONTRERAS 0.7.  The patient also had an episode of syncope while taking a shower last week for which she went to [facility].  She does report dizziness mostly while taking a shower but not necessarily with exertion.  She no longer has chest pain since she had the PCI.        Patient is independent in ADLs and IADLs, she does not use any assistance while walking.  She is referred to our clinic for severe symptomatic aortic stenosis              ROS:     Constitutional: fatigue  Eyes: no eyesight problems, no blurred vision, no diplopia, no eye pain, no purulent discharge from the eyes, eyes not red, no dryness of the eyes and no itching of the eyes.   ENT: no nosebleeds, no hearing loss, no tinnitus, no earache, no sore throat, no hoarseness, no swollen glands in the neck and no nasal discharge.   Cardiovascular: dyspnea on exertion, no chest pain  Respiratory: no chronic cough, not coughing up sputum, no wheezing that is consistent with asthma  Gastrointestinal: no change in bowel habits, no blood in stools, no diarrhea, no constipation, no nausea, no vomiting, no abdominal pain, no signs and symptoms of ulcer disease, no alex colored stools and no intolerance to fatty foods.   Genitourinary: no hematuria,  no urinary frequency, no dysuria, no incontinence, no burning sensation during urination, no urinary hesitancy, no nocturia, no genital lesion, no testicular pain, urinary stream is not smaller and urinary stream does not start and stop.   Musculoskeletal: no arthralgias, no myalgias, no joint swelling, no joint stiffness, no muscle weakness, no back pain, no limb pain, no limb swelling and no difficulty walking.   Skin: no skin rashes, no  change in skin color and pigmentation, no skin lesions and no skin lumps.   Neurological: no seizures, no frequent falls, no headaches, no dizziness, no tingling, no fainting and no limb weakness.   Psychiatric: no depression, not suicidal, no confusion, no memory lapses or loss, no anxiety, no personality change and no emotional problems.   Endocrine: no goiter, no thyroid disorder, no diabetes mellitus, no excessive thirst, no dry skin, no cold intolerance, no heat intolerance, no erectile dysfunction, no increased urinary frequency, no proptosis and no deepening of the voice.   Hematologic/Lymphatic: no bleeding issues.   All other systems have been reviewed and are negative for complaint.         TAVR Workup:   - NYHA: II  - Frailty: 0/5  - EKG: NSR, , QRS 96  - TTE: EF 60%, PV 4.08, MG 35, CONTRERAS 0.7  - CT TAVR: Pending  - LHC: 12/23 PCI proximal/mid RCA  - dental clearance: No reported dental issues, sees dentist every 6 months  - STS  Procedure Type: Isolated AVR  Perioperative Outcome           Estimate %  Operative Mortality     2.13%  Morbidity & Mortality 6.28%  Stroke  0.936%  Renal Failure    1.06%  Reoperation     3.13%  Prolonged Ventilation 3.32%  Deep Sternal Wound Infection            0.044%  Long Hospital Stay (>14 days)            2.77%  Short Hospital Stay (<6 days)*            53.6%     Physical Exam:  Constitutional: alert and in no acute distress.   Eyes: no erythema, swelling or discharge from the eye .   ENT: no erythema, edema, exudate or lesions .   Neck: neck is supple, no JVD  Pulmonary: no increased work of breathing or signs of respiratory distress , lungs clear to auscultation. , normal percussion of chest  and chest palpation normal .   Cardiovascular: RRR, 3/6 GILDA RUSB,  no pitting edema, non-displaced PMI, no S3 or S4  Abdomen: abdomen non-tender, no masses  and no hepatomegaly .   Neurologic: non-focal neurologic examination.              12/13/2023    10:20 AM 12/13/2023     "10:21 AM   Vitals   Systolic 184 180   Diastolic 102 80   Heart Rate 77     Height (in) 1.588 m (5' 2.5\")     Weight (lb) 137     BMI 24.66 kg/m2     BSA (m2) 1.65 m2     Visit Report Report Report              Current Outpatient Medications   Medication Instructions    atorvastatin (LIPITOR) 40 mg, oral, Daily    dicyclomine (BENTYL) 20 mg, oral, 4 times daily before meals and nightly    ferrous sulfate 325 (65 Fe) MG EC tablet 2 tablets, oral, Daily, Do not crush, chew, or split.    levothyroxine (SYNTHROID, LEVOXYL) 100 mcg, oral, Daily before breakfast    lisinopriL-hydrochlorothiazide 20-12.5 mg tablet 1 tablet, oral, Daily    metoprolol succinate XL (TOPROL-XL) 25 mg, oral, Daily, Do not crush or chew.         Impression:   73-year-old lady with past medical history of CAD with PCI to RCA (12/23), hypertension, dyslipidemia, CKD and anemia.     The patient has severe symptomatic aortic stenosis with class II heart failure symptoms.  She is referred to our clinic for further evaluation and treatment planning    Plan for TAVR today   "

## 2024-02-13 NOTE — Clinical Note
Temporary pacemaker turned on. Temporary pacemaker location through right internal jugular vein. Heart rate: 180. Current 10 (mA). During valvuloplasty, returned to back up

## 2024-02-13 NOTE — Clinical Note
Temporary pacemaker tested. Temporary pacemaker location through right internal jugular vein. Heart rate: 110. Current 1 (mA). After testing pacemaker placed to back up rate of 40 and mA of 10

## 2024-02-13 NOTE — OP NOTE
TAVR (Transcatheter AV Replacement), TVP for TAVR, Left Heart Cath, No LV Operative Note     Date: 2024  OR Location: [address] Cardiac Cath Lab    Name: Neha Stroud : 1950, Age: 73 y.o., MRN: [id number], Sex: female  Date of Procedure: 2024    Pre-operative Diagnosis:   1. Symptomatic severe aortic stenosis    Post-Operative Diagnosis:   1. Symptomatic severe aortic stenosis    Procedure:   1. Transcatheter Aortic Valve Replacement (29 mm Medtronic Evolut FX SN: [id number])   2. Percutaneous placement of right ventricular pacemaker.  3. Left heart catheterization including left ventricular end diastolic pressure (LVEDP)  4. Percutaneous pre-closure of the right and left common femoral artery  5. Aortic root angiography  6. Percutaneous balloon aortic valvuloplasty (20 mm True Balloon)    Surgeon:   Kirk Kate MD    Cardiologist:   Kevin Rosario MD; MD Nadege Vazquez MD    Indications:     Ms. Neha Stroud is an 73 y.o. female, who is a patient of JOSE DE JESUS Alex-CNP   Referring cardiologist is Jose Rowell  The patient was referred to the structural heart service and evaluated thoroughly, and presented at our multidisciplinary meeting.  The consensus of the group was to offer a TAVR procedure based on anatomy and symptoms.  A transcatheter valve was implanted without any significant difficulties or concerns.    Intra-operative findings:     Severe calcification of the aortic valve leaflets.  Trileaflet heavily calcified valve.    Implant height at 2 mm on the non-coronary annulus and 2 mm on the left coronary annulus, there was commisural alignment  Post-deployment transthoracic echocardiography showed that there was trivial paravalvular leak, and the mean gradient across the valve was 5 mmHg.  There was no heart block throughout the case.      Procedure details:    After informed consent was obtained, and all questions asked and answered to the  patient's satisfaction, they were brought back to the cardiology catheterization laboratory and placed on the Cath Lab table.  A timeout was performed with the correct patient, correct procedure, the correct laterality, timing and dosage of antibiotics, the availability of blood products, and all correct equipment was verified as being present prior to beginning the case.  The patient was prepped and draped in the appropriate fashion.    Ultrasound guidance was utilized to place a 7 Lithuanian locking sheath in the right internal jugular vein.  This was done using a modified Seldinger technique.  A balloon tipped pacing wire was then floated through to the right ventricle and locked into place, adequate pacing thresholds were noted. Next an 6 Fr sheath was placed in the left common femoral artery using a modified Seldinger technique.  A 6 Fr pigtail was placed over a wire into the non-coronary sinus. Next attention was placed to the right common femoral artery, and using fluoroscopic guidance and a micropuncture needle, it was accessed using modified Seldinger technique.  This was exchanged for a 7 Lithuanian dilator, and then 2 Pro-glide Perclose sutures were placed.  Next the artery was dilated, and a 18 Lithuanian  sheath was placed in the primary access artery, under direct fluoroscopic guidance using a Supercore wire.    The patient was then systemically heparinized. An 180 cm J-wire was then used to place an JR-4 catheter in the ascending aorta and a floppy tip straight wire was utilized to cross the aortic valve and JR-4 catheter was moved into the left ventricle.  An 180 cm exchange length J-wire was then utilized over the through the JR-4 catheter, this was exchanged for a pigtail catheter, hemodynamics were measured, including LVEDP. A Safari wire was placed through the pigtail catheter into the left ventricle.  The Evolut valve was then verified under fluoroscopy to ensure proper loading.        A balloon  valvuloplasty was performed using a 20 True balloon, while pacing at 180 BPM.  The balloon was then removed and the wire maintained into the LV position.    Next, the sheath was removed from the primary access artery, over the wire, and the delivery system and valve inserted into the primary access artery.  The valve was then advanced on the delivery system across the arch and across the native aortic valve.  The valve was placed in the appropriate position and slowly deployed while pacing at 120-160 BPM.  After the valve was deployed at 80%, we paused to evaluate the location and position.  We confirmed cusp overlap in standard fashion, and after general consensus agreement, proceeded to deploy the valve.  However at this point we felt the valve was a bit too shallow.  We chose to recapture it and to redeployed.  We recaptured in the ascending aorta, we readvanced it and deployed again to 80% deployment.  We were happy with the position this time.  We decided to fully deployed.  While pacing at 120-160 BP, the valve was fully deployed and the paddles released.  The nose cone was then retracted into the ascending aorta, and the valve was assessed with echo.  The delivery system was recaptured.  The valve was crossed with a pigtail, and hemodynamics, including LVEDP were measured.      A supracore wire was advanced into the delivery system, which was then removed from the primary access artery over the supracore wire, and the the 2 previously placed proglide Perclose sutures were cinched and locked, with good hemostasis and a good distal pulse.  The secondary access arterial site was also closed with a Perclose device, with good hemostasis and a good distal pulse.  Patient then was taken to the floor in stable condition.  Sponge and instrument counts were correct.  As the attending surgeon, I was present or immediately available for all aspects of the operation.

## 2024-02-13 NOTE — POST-PROCEDURE NOTE
Physician Transition of Care Summary  Invasive Cardiovascular Lab    Procedure Date: 2/13/2024  Attending: Panel 1:     * Aldair Newberry - Primary  Panel 2:     * Kirk Kate - Primary  Resident/Fellow/Other Assistant: Surgeon(s) and Role:  Panel 1:     * Jaren Enriquez MD - Fellow     * Nadege Avila MD - Fellow    Indications:   Pre-op Diagnosis     * Nonrheumatic aortic valve stenosis [I35.0]    Post-procedure diagnosis:   Post-op Diagnosis     * Nonrheumatic aortic valve stenosis [I35.0]    Procedure(s):   TAVR (Transcatheter AV Replacement)    TVP for TAVR    TAVR-OR    Left Heart Cath, No LV  16803 - RI L HRT CATH W/NJX L VENTRICULOGRAPHY IMG S&I    RI REPLACE AORTIC VALVE PERQ FEMORAL ARTRY APPROACH [16131]        Description of the Procedure:     Description of the Procedure:   S/p TAVR with Evolut fx 29, Predil 20 mm TruDil Balloon   Indication: Severe AS    Access  Primary: right CFA s/p 2 proglide  Secondary: left CFA 6 Kyrgyz s/p 1 proglide  TVP: right IJ vein, 7 Kyrgyz, removed in the cath lab. (transient LBBB)      LVEDP 21>>. 17   Intra-op echo - Trace  PVL, No effusion, mean gradient 7 mm Hg    Conclusion  Monitor tele  Monitor access sites for bleeding  TTE tomorrow  Structural team will continue to follow.   Likely discharge tomorrow         Complications:   None       Estimated Blood Loss:   10 mL    Anesthesia: Moderate Sedation Anesthesia Staff: No anesthesia staff entered.    Any Specimen(s) Removed:   Order Name Source Comment Collection Info Order Time   CBC Blood, Venous  Collected By: Donny Archer RN 2/13/2024  8:59 AM     Release result to [employer]   Immediate        BASIC METABOLIC PANEL Blood, Venous  Collected By: Donny Archer RN 2/13/2024  8:59 AM     Release result to [employer]amed [employer]   Immediate        COAGULATION SCREEN Blood, Venous  Collected By: Donny Archer RN 2/13/2024  8:59 AM     Release result to [employer]amed [employer]   Immediate        BASIC METABOLIC PANEL Blood,  Venous  Collected By: Donny Archer RN 2/13/2024  1:40 PM     Release result to MyChart   Immediate        MAGNESIUM Blood, Venous  Collected By: Donny Archer RN 2/13/2024  1:40 PM     Release result to MyChart   Immediate        CBC WITH AUTO DIFFERENTIAL Blood, Venous  Collected By: Donny Archer RN 2/13/2024  1:40 PM     Release result to MyChart   Immediate        PROTIME-INR Blood, Venous  Collected By: Donny Archer RN 2/13/2024  1:40 PM     Release result to MyChart   Immediate                  Electronically signed by: Jaren Enriquez MD, 2/13/2024 2:08 PM

## 2024-02-14 ENCOUNTER — HOSPITAL ENCOUNTER (OUTPATIENT)
Dept: CARDIOLOGY | Facility: HOSPITAL | Age: 74
Discharge: HOME | DRG: 267 | End: 2024-02-14
Payer: MEDICARE

## 2024-02-14 ENCOUNTER — APPOINTMENT (OUTPATIENT)
Dept: CARDIOLOGY | Facility: HOSPITAL | Age: 74
DRG: 267 | End: 2024-02-14
Payer: MEDICARE

## 2024-02-14 VITALS
DIASTOLIC BLOOD PRESSURE: 83 MMHG | SYSTOLIC BLOOD PRESSURE: 116 MMHG | HEART RATE: 85 BPM | TEMPERATURE: 98.8 F | OXYGEN SATURATION: 98 % | WEIGHT: 137.79 LBS | RESPIRATION RATE: 18 BRPM | BODY MASS INDEX: 25.2 KG/M2

## 2024-02-14 LAB
ANION GAP SERPL CALC-SCNC: 14 MMOL/L (ref 10–20)
AORTIC VALVE MEAN GRADIENT: 9 MMHG
AORTIC VALVE PEAK VELOCITY: 2.32 M/S
ATRIAL RATE: 67 BPM
AV PEAK GRADIENT: 21.5 MMHG
AVA (PEAK VEL): 2.24 CM2
AVA (VTI): 2.15 CM2
BASOPHILS # BLD AUTO: 0.03 X10*3/UL (ref 0–0.1)
BASOPHILS NFR BLD AUTO: 0.6 %
BUN SERPL-MCNC: 20 MG/DL (ref 6–23)
CALCIUM SERPL-MCNC: 8.9 MG/DL (ref 8.6–10.6)
CHLORIDE SERPL-SCNC: 99 MMOL/L (ref 98–107)
CO2 SERPL-SCNC: 22 MMOL/L (ref 21–32)
CREAT SERPL-MCNC: 0.82 MG/DL (ref 0.5–1.05)
EGFRCR SERPLBLD CKD-EPI 2021: 75 ML/MIN/1.73M*2
EJECTION FRACTION APICAL 4 CHAMBER: 62.8
EJECTION FRACTION: 63 %
EOSINOPHIL # BLD AUTO: 0.25 X10*3/UL (ref 0–0.4)
EOSINOPHIL NFR BLD AUTO: 4.8 %
ERYTHROCYTE [DISTWIDTH] IN BLOOD BY AUTOMATED COUNT: 13.6 % (ref 11.5–14.5)
GLUCOSE SERPL-MCNC: 87 MG/DL (ref 74–99)
HCT VFR BLD AUTO: 26.8 % (ref 36–46)
HGB BLD-MCNC: 9.5 G/DL (ref 12–16)
IMM GRANULOCYTES # BLD AUTO: 0.03 X10*3/UL (ref 0–0.5)
IMM GRANULOCYTES NFR BLD AUTO: 0.6 % (ref 0–0.9)
INR PPP: 1 (ref 0.9–1.1)
LEFT ATRIUM VOLUME AREA LENGTH INDEX BSA: 34.8 ML/M2
LEFT VENTRICLE INTERNAL DIMENSION DIASTOLE: 4.4 CM (ref 3.5–6)
LEFT VENTRICULAR OUTFLOW TRACT DIAMETER: 2.1 CM
LYMPHOCYTES # BLD AUTO: 0.64 X10*3/UL (ref 0.8–3)
LYMPHOCYTES NFR BLD AUTO: 12.2 %
MAGNESIUM SERPL-MCNC: 1.46 MG/DL (ref 1.6–2.4)
MCH RBC QN AUTO: 30.6 PG (ref 26–34)
MCHC RBC AUTO-ENTMCNC: 35.4 G/DL (ref 32–36)
MCV RBC AUTO: 87 FL (ref 80–100)
MITRAL VALVE E/A RATIO: 0.87
MITRAL VALVE E/E' RATIO: 13.59
MONOCYTES # BLD AUTO: 0.44 X10*3/UL (ref 0.05–0.8)
MONOCYTES NFR BLD AUTO: 8.4 %
NEUTROPHILS # BLD AUTO: 3.85 X10*3/UL (ref 1.6–5.5)
NEUTROPHILS NFR BLD AUTO: 73.4 %
NRBC BLD-RTO: 0 /100 WBCS (ref 0–0)
P AXIS: 61 DEGREES
P OFFSET: 204 MS
P ONSET: 138 MS
PLATELET # BLD AUTO: 179 X10*3/UL (ref 150–450)
POTASSIUM SERPL-SCNC: 4.1 MMOL/L (ref 3.5–5.3)
PR INTERVAL: 172 MS
PROTHROMBIN TIME: 11.7 SECONDS (ref 9.8–12.8)
Q ONSET: 224 MS
QRS COUNT: 12 BEATS
QRS DURATION: 86 MS
QT INTERVAL: 412 MS
QTC CALCULATION(BAZETT): 435 MS
QTC FREDERICIA: 427 MS
R AXIS: 23 DEGREES
RBC # BLD AUTO: 3.1 X10*6/UL (ref 4–5.2)
RIGHT VENTRICLE FREE WALL PEAK S': 15.8 CM/S
RIGHT VENTRICLE PEAK SYSTOLIC PRESSURE: 27.4 MMHG
SODIUM SERPL-SCNC: 131 MMOL/L (ref 136–145)
T AXIS: 34 DEGREES
T OFFSET: 430 MS
TRICUSPID ANNULAR PLANE SYSTOLIC EXCURSION: 2.3 CM
VENTRICULAR RATE: 67 BPM
WBC # BLD AUTO: 5.2 X10*3/UL (ref 4.4–11.3)

## 2024-02-14 PROCEDURE — 2500000002 HC RX 250 W HCPCS SELF ADMINISTERED DRUGS (ALT 637 FOR MEDICARE OP, ALT 636 FOR OP/ED): Performed by: NURSE PRACTITIONER

## 2024-02-14 PROCEDURE — 99238 HOSP IP/OBS DSCHRG MGMT 30/<: CPT | Performed by: NURSE PRACTITIONER

## 2024-02-14 PROCEDURE — 93005 ELECTROCARDIOGRAM TRACING: CPT

## 2024-02-14 PROCEDURE — 93010 ELECTROCARDIOGRAM REPORT: CPT | Performed by: INTERNAL MEDICINE

## 2024-02-14 PROCEDURE — 85610 PROTHROMBIN TIME: CPT | Performed by: NURSE PRACTITIONER

## 2024-02-14 PROCEDURE — 93325 DOPPLER ECHO COLOR FLOW MAPG: CPT | Performed by: INTERNAL MEDICINE

## 2024-02-14 PROCEDURE — 93308 TTE F-UP OR LMTD: CPT | Performed by: INTERNAL MEDICINE

## 2024-02-14 PROCEDURE — 93321 DOPPLER ECHO F-UP/LMTD STD: CPT | Performed by: INTERNAL MEDICINE

## 2024-02-14 PROCEDURE — 83735 ASSAY OF MAGNESIUM: CPT | Performed by: NURSE PRACTITIONER

## 2024-02-14 PROCEDURE — 2500000004 HC RX 250 GENERAL PHARMACY W/ HCPCS (ALT 636 FOR OP/ED): Performed by: NURSE PRACTITIONER

## 2024-02-14 PROCEDURE — 2500000001 HC RX 250 WO HCPCS SELF ADMINISTERED DRUGS (ALT 637 FOR MEDICARE OP): Performed by: NURSE PRACTITIONER

## 2024-02-14 PROCEDURE — 85025 COMPLETE CBC W/AUTO DIFF WBC: CPT | Performed by: NURSE PRACTITIONER

## 2024-02-14 PROCEDURE — 93325 DOPPLER ECHO COLOR FLOW MAPG: CPT

## 2024-02-14 PROCEDURE — 82374 ASSAY BLOOD CARBON DIOXIDE: CPT | Performed by: NURSE PRACTITIONER

## 2024-02-14 PROCEDURE — 36415 COLL VENOUS BLD VENIPUNCTURE: CPT | Performed by: NURSE PRACTITIONER

## 2024-02-14 RX ORDER — MAGNESIUM SULFATE HEPTAHYDRATE 40 MG/ML
2 INJECTION, SOLUTION INTRAVENOUS ONCE
Status: COMPLETED | OUTPATIENT
Start: 2024-02-14 | End: 2024-02-14

## 2024-02-14 RX ORDER — LISINOPRIL AND HYDROCHLOROTHIAZIDE 12.5; 2 MG/1; MG/1
0.5 TABLET ORAL DAILY
Start: 2024-02-14 | End: 2024-02-20 | Stop reason: ALTCHOICE

## 2024-02-14 RX ADMIN — ASPIRIN 81 MG: 81 TABLET, COATED ORAL at 09:03

## 2024-02-14 RX ADMIN — TICAGRELOR 90 MG: 90 TABLET ORAL at 09:03

## 2024-02-14 RX ADMIN — MAGNESIUM SULFATE HEPTAHYDRATE 2 G: 40 INJECTION, SOLUTION INTRAVENOUS at 09:47

## 2024-02-14 RX ADMIN — TRAMADOL HYDROCHLORIDE 50 MG: 50 TABLET, COATED ORAL at 03:47

## 2024-02-14 RX ADMIN — LEVOTHYROXINE SODIUM 100 MCG: 100 TABLET ORAL at 06:31

## 2024-02-14 RX ADMIN — PANTOPRAZOLE SODIUM 40 MG: 40 TABLET, DELAYED RELEASE ORAL at 06:31

## 2024-02-14 RX ADMIN — TRAMADOL HYDROCHLORIDE 50 MG: 50 TABLET, COATED ORAL at 09:47

## 2024-02-14 RX ADMIN — ATORVASTATIN CALCIUM 40 MG: 40 TABLET, FILM COATED ORAL at 09:03

## 2024-02-14 ASSESSMENT — COGNITIVE AND FUNCTIONAL STATUS - GENERAL
MOBILITY SCORE: 24
DAILY ACTIVITIY SCORE: 24

## 2024-02-14 ASSESSMENT — PAIN SCALES - GENERAL
PAINLEVEL_OUTOF10: 6
PAINLEVEL_OUTOF10: 0 - NO PAIN

## 2024-02-14 ASSESSMENT — PAIN - FUNCTIONAL ASSESSMENT
PAIN_FUNCTIONAL_ASSESSMENT: 0-10

## 2024-02-14 NOTE — DISCHARGE SUMMARY
Discharge Diagnosis  Aortic stenosis    Issues Requiring Follow-Up  None      Hospital Course   Patient Vitals for the past 24 hrs:   BP Temp Temp src Pulse Resp SpO2 Weight   02/14/24 0728 129/64 -- -- 77 18 96 % --   02/14/24 0336 119/61 36.6 °C (97.9 °F) -- 68 18 98 % --   02/14/24 0100 -- -- -- -- -- -- 62.5 kg (137 lb 12.6 oz)   02/13/24 2359 119/54 37 °C (98.6 °F) -- 70 17 96 % --   02/13/24 2011 101/51 36.6 °C (97.9 °F) Temporal 59 18 97 % --   02/13/24 1730 149/81 -- -- 66 16 100 % --   02/13/24 1630 158/79 -- -- 59 16 100 % --   02/13/24 1530 147/72 -- -- 58 16 98 % --   02/13/24 1504 154/76 36.5 °C (97.7 °F) Temporal 65 12 99 % --   02/13/24 1154 158/70 -- -- 63 12 99 % --       Results for orders placed or performed during the hospital encounter of 02/13/24 (from the past 96 hour(s))   ECG 12 lead daily   Result Value Ref Range    Ventricular Rate 57 BPM    Atrial Rate 57 BPM    FL Interval 174 ms    QRS Duration 88 ms    QT Interval 426 ms    QTC Calculation(Bazett) 414 ms    P Axis 71 degrees    R Axis 31 degrees    T Axis 38 degrees    QRS Count 10 beats    Q Onset 218 ms    P Onset 131 ms    P Offset 203 ms    T Offset 431 ms    QTC Fredericia 418 ms   CBC   Result Value Ref Range    WBC 7.3 4.4 - 11.3 x10*3/uL    nRBC 0.0 0.0 - 0.0 /100 WBCs    RBC 4.18 4.00 - 5.20 x10*6/uL    Hemoglobin 12.8 12.0 - 16.0 g/dL    Hematocrit 38.7 36.0 - 46.0 %    MCV 93 80 - 100 fL    MCH 30.6 26.0 - 34.0 pg    MCHC 33.1 32.0 - 36.0 g/dL    RDW 13.8 11.5 - 14.5 %    Platelets 248 150 - 450 x10*3/uL   Basic Metabolic Panel   Result Value Ref Range    Glucose 84 74 - 99 mg/dL    Sodium 134 (L) 136 - 145 mmol/L    Potassium 4.0 3.5 - 5.3 mmol/L    Chloride 99 98 - 107 mmol/L    Bicarbonate 26 21 - 32 mmol/L    Anion Gap 13 10 - 20 mmol/L    Urea Nitrogen 26 (H) 6 - 23 mg/dL    Creatinine 1.01 0.50 - 1.05 mg/dL    eGFR 58 (L) >60 mL/min/1.73m*2    Calcium 9.7 8.6 - 10.6 mg/dL   Coagulation Screen   Result Value Ref Range     Protime 10.7 9.8 - 12.8 seconds    INR 1.0 0.9 - 1.1    aPTT 34 27 - 38 seconds   Transthoracic Echo (TTE) Limited   Result Value Ref Range    AV pk erick 3.62 m/s    AV mn grad 30.0 mmHg    LVOT diam 2.00 cm    LA vol index A/L 44.8 ml/m2    LVIDd 3.80 cm    Aortic Valve Area by Continuity of VTI 0.87 cm2    Aortic Valve Area by Continuity of Peak Velocity 0.95 cm2    AV pk grad 52.4 mmHg    LV A4C EF 62.1    ECG 12 lead   Result Value Ref Range    Ventricular Rate 51 BPM    Atrial Rate 51 BPM    MI Interval 184 ms    QRS Duration 94 ms    QT Interval 480 ms    QTC Calculation(Bazett) 442 ms    P Axis 72 degrees    R Axis 18 degrees    T Axis 47 degrees    QRS Count 8 beats    Q Onset 219 ms    P Onset 127 ms    P Offset 194 ms    T Offset 459 ms    QTC Fredericia 455 ms   Basic metabolic panel   Result Value Ref Range    Glucose 78 74 - 99 mg/dL    Sodium 134 (L) 136 - 145 mmol/L    Potassium 4.0 3.5 - 5.3 mmol/L    Chloride 102 98 - 107 mmol/L    Bicarbonate 23 21 - 32 mmol/L    Anion Gap 13 10 - 20 mmol/L    Urea Nitrogen 22 6 - 23 mg/dL    Creatinine 0.88 0.50 - 1.05 mg/dL    eGFR 69 >60 mL/min/1.73m*2    Calcium 8.6 8.6 - 10.6 mg/dL   Magnesium   Result Value Ref Range    Magnesium 1.62 1.60 - 2.40 mg/dL   CBC and Auto Differential   Result Value Ref Range    WBC 5.5 4.4 - 11.3 x10*3/uL    nRBC 0.0 0.0 - 0.0 /100 WBCs    RBC 3.45 (L) 4.00 - 5.20 x10*6/uL    Hemoglobin 10.3 (L) 12.0 - 16.0 g/dL    Hematocrit 31.8 (L) 36.0 - 46.0 %    MCV 92 80 - 100 fL    MCH 29.9 26.0 - 34.0 pg    MCHC 32.4 32.0 - 36.0 g/dL    RDW 14.0 11.5 - 14.5 %    Platelets 196 150 - 450 x10*3/uL    Neutrophils % 61.8 40.0 - 80.0 %    Immature Granulocytes %, Automated 0.2 0.0 - 0.9 %    Lymphocytes % 25.2 13.0 - 44.0 %    Monocytes % 6.8 2.0 - 10.0 %    Eosinophils % 5.5 0.0 - 6.0 %    Basophils % 0.5 0.0 - 2.0 %    Neutrophils Absolute 3.39 1.60 - 5.50 x10*3/uL    Immature Granulocytes Absolute, Automated 0.01 0.00 - 0.50 x10*3/uL     Lymphocytes Absolute 1.38 0.80 - 3.00 x10*3/uL    Monocytes Absolute 0.37 0.05 - 0.80 x10*3/uL    Eosinophils Absolute 0.30 0.00 - 0.40 x10*3/uL    Basophils Absolute 0.03 0.00 - 0.10 x10*3/uL   Protime-INR   Result Value Ref Range    Protime 15.0 (H) 9.8 - 12.8 seconds    INR 1.3 (H) 0.9 - 1.1   CBC and Auto Differential   Result Value Ref Range    WBC 5.2 4.4 - 11.3 x10*3/uL    nRBC 0.0 0.0 - 0.0 /100 WBCs    RBC 3.10 (L) 4.00 - 5.20 x10*6/uL    Hemoglobin 9.5 (L) 12.0 - 16.0 g/dL    Hematocrit 26.8 (L) 36.0 - 46.0 %    MCV 87 80 - 100 fL    MCH 30.6 26.0 - 34.0 pg    MCHC 35.4 32.0 - 36.0 g/dL    RDW 13.6 11.5 - 14.5 %    Platelets 179 150 - 450 x10*3/uL    Neutrophils % 73.4 40.0 - 80.0 %    Immature Granulocytes %, Automated 0.6 0.0 - 0.9 %    Lymphocytes % 12.2 13.0 - 44.0 %    Monocytes % 8.4 2.0 - 10.0 %    Eosinophils % 4.8 0.0 - 6.0 %    Basophils % 0.6 0.0 - 2.0 %    Neutrophils Absolute 3.85 1.60 - 5.50 x10*3/uL    Immature Granulocytes Absolute, Automated 0.03 0.00 - 0.50 x10*3/uL    Lymphocytes Absolute 0.64 (L) 0.80 - 3.00 x10*3/uL    Monocytes Absolute 0.44 0.05 - 0.80 x10*3/uL    Eosinophils Absolute 0.25 0.00 - 0.40 x10*3/uL    Basophils Absolute 0.03 0.00 - 0.10 x10*3/uL   Basic Metabolic Panel   Result Value Ref Range    Glucose 87 74 - 99 mg/dL    Sodium 131 (L) 136 - 145 mmol/L    Potassium 4.1 3.5 - 5.3 mmol/L    Chloride 99 98 - 107 mmol/L    Bicarbonate 22 21 - 32 mmol/L    Anion Gap 14 10 - 20 mmol/L    Urea Nitrogen 20 6 - 23 mg/dL    Creatinine 0.82 0.50 - 1.05 mg/dL    eGFR 75 >60 mL/min/1.73m*2    Calcium 8.9 8.6 - 10.6 mg/dL   Magnesium   Result Value Ref Range    Magnesium 1.46 (L) 1.60 - 2.40 mg/dL   Protime-INR   Result Value Ref Range    Protime 11.7 9.8 - 12.8 seconds    INR 1.0 0.9 - 1.1   Transthoracic Echo (TTE) Limited   Result Value Ref Range    AV pk erick 2.32 m/s    AV mn grad 9.0 mmHg    LVOT diam 2.10 cm    LV biplane EF 63 %    MV avg E/e' ratio 13.59     MV E/A ratio  0.87     LA vol index A/L 34.8 ml/m2    Tricuspid annular plane systolic excursion 2.3 cm    RV free wall pk S' 15.80 cm/s    LVIDd 4.40 cm    RVSP 27.4 mmHg    Aortic Valve Area by Continuity of VTI 2.15 cm2    Aortic Valve Area by Continuity of Peak Velocity 2.24 cm2    AV pk grad 21.5 mmHg    LV A4C EF 62.8        Neha Stroud is [id number] lady with past medical history of CAD with PCI to RCA (12/23), hypertension, dyslipidemia, CKD and anemia. S/p B/L femoral artery (Right primary) Evolut FX 29mm predil with 20mm Trudil balloon via right Femoral Artery on  2/13/24  Pressures on soft side, will decrease Lisinopril on dc          EKG pre shows SB Mariangel 174 QRS 88  EKG post shows SR Mariangel 172 QRS 86    Doing well clinically, euvolemic on exam. Bilat groins with DSD no evidence of bleeding, oozing, hematoma or ecchymosis.  There were no events noted on telemetry overnight.      Plan  -Ambulate and reassess groins  -Echo per protocol   -Brilinta and ASA  -Reduce Lisinopril/hydrochlorothiazide to 10mg/6.25mg  -D/C home   -Follow up with PCP in 1-2 weeks  -Follow up with Primary cards in 6-10 weeks  -Follow up with Structural NP in virtual clinic at 1 week, 1 month and 1 year with echo at 1 mo and year    D/w Dr. Coni CARTER personally spent 55 minutes with this patient, of which >50% of time was spent counseling and coordination of care            Pertinent Physical Exam At Time of Discharge  Physical Exam  Constitutional: Well developed, awake/alert/oriented x3, no distress,  cooperative  Eyes: PERRL, EOMI, clear sclera  ENMT: mucous membranes moist, no apparent injury, no lesions seen  Head/Neck: Neck supple, no apparent injury, thyroid without mass or tenderness, No JVD, trachea midline, no bruits  Respiratory/Thorax: Patent airways,  good chest expansion, thorax symmetric, cta  Cardiovascular: Regular rate and rhythm, no murmurs, normal S 1and S 2  Gastrointestinal: Nondistended, soft, non-tender, no rebound  tenderness or guarding, no masses palpable, no organomegaly, +BS, no bruits  Extremities: normal extremities, no cyanosis,  bilat groins c/d/i with dsd no s/s of hematoma  Neurological: alert and oriented x3, intact senses, motor, response and reflexes, normal strength  Psychological: Appropriate mood and behavior   Skin: Warm and dry, intact   Home Medications     Medication List      CHANGE how you take these medications     lisinopriL-hydrochlorothiazide 20-12.5 mg tablet; Take 0.5 tablets by   mouth once daily.; What changed: how much to take     CONTINUE taking these medications     acetaminophen 500 mg tablet; Commonly known as: Tylenol   aspirin 81 mg EC tablet   atorvastatin 40 mg tablet; Commonly known as: Lipitor; Take 1 tablet (40   mg) by mouth once daily.   Brilinta 90 mg tablet; Generic drug: ticagrelor   dicyclomine 20 mg tablet; Commonly known as: Bentyl   ferrous sulfate 325 (65 Fe) MG EC tablet   levothyroxine 100 mcg tablet; Commonly known as: Synthroid, Levoxyl   metoprolol succinate XL 25 mg 24 hr tablet; Commonly known as:   Toprol-XL; Take 1 tablet (25 mg) by mouth once daily. Do not crush or   chew.   rizatriptan 10 mg tablet; Commonly known as: Maxalt       Outpatient Follow-Up  Future Appointments   Date Time Provider Department [place]   2/15/2024  3:00 AM [facility] 3 [facility]   2/20/2024 10:20 AM Ruben Pimentel DO [id number] [place]   2/23/2024  2:00 PM  TALHA [id number] CARD1 [id number] [facility]   3/14/2024 11:00 AM  TALHA [id number] CARD1 [id number] [place]   2/14/2025 10:00 AM  TALHA [id number] CARD1 [id number] [facility]       Jeff Mccauley, APRN-CNP

## 2024-02-14 NOTE — NURSING NOTE
2/14/24 1347 Discharge Note:  Patient discharged to home. IV removed. Patient and RN went over discharge instructions and patient verbalized understanding. -Elizabeth Renae RN

## 2024-02-14 NOTE — CARE PLAN
"  Problem: Safety  Goal: Patient will be injury free during hospitalization  Outcome: Progressing     Problem: Pain  Goal: My pain/discomfort is manageable  Outcome: Progressing     Problem: Psychosocial Needs  Goal: Demonstrates ability to cope with hospitalization/illness  Outcome: Progressing   The patient's goals for the shift include      The clinical goals for the shift include \"pain control\"      "

## 2024-02-14 NOTE — PROGRESS NOTES
Neha Stroud is a 73 y.o. female on day 1 of admission presenting with Aortic stenosis.  Introduced myself and role to patient.  Patient lives with spouse.  Patient is independent in all adl's.  Requires no assist devices for mobility.  Patient.  Patient is safe at home and denies any issues with making follow up appointments or getting her medications.  Patient denies active home care, but is agreeable to post discharge home care if required.  Family will transport patient home.   PCP:  Loree Roy CNP  Pharmacy:  [facility]  Home Care:  N/A  DME:  N/A      Physical Exam    Last Recorded Vitals  Blood pressure 129/64, pulse 77, temperature 36.6 °C (97.9 °F), resp. rate 18, weight 62.5 kg (137 lb 12.6 oz), SpO2 96 %.  Intake/Output last 3 Shifts:  I/O last 3 completed shifts:  In: 500 (8 mL/kg) [IV Piggyback:500]  Out: 10 (0.2 mL/kg) [Blood:10]  Weight: 62.5 kg         Assessment/Plan   Principal Problem:    Aortic stenosis  Active Problems:    S/P TAVR (transcatheter aortic valve replacement)            Alejandra Malone RN

## 2024-02-15 ENCOUNTER — PATIENT OUTREACH (OUTPATIENT)
Dept: CARDIOLOGY | Facility: CLINIC | Age: 74
End: 2024-02-15
Payer: MEDICARE

## 2024-02-15 LAB
ACT BLD: 261 SEC (ref 89–169)
ACT BLD: 276 SEC (ref 89–169)

## 2024-02-15 NOTE — PROGRESS NOTES
Discharge Facility: [facility]  Discharge Diagnosis: Aortic Stenosis  Admission Date: 2/13/24  Discharge Date: 2/14/24    PCP Appointment Date: Encouraged patient to make a follow up  Specialist Appointment Date:  Dr Pimentel 2/20/24, Cardiac virtual 2/23/24  Hospital Encounter and Summary: Linked   See discharge assessment below for further details     Engagement  Call Start Time: 1404 (2/15/2024  2:10 PM)    Medications  Medications reviewed with patient/caregiver?: No (2/15/2024  2:10 PM)  Is the patient having any side effects they believe may be caused by any medication additions or changes?: No (2/15/2024  2:10 PM)  Does the patient have all medications ordered at discharge?: Yes (2/15/2024  2:10 PM)    Appointments  Does the patient have a primary care provider?: Yes (2/15/2024  2:10 PM)  Care Management Interventions: Advised patient to make appointment (2/15/2024  2:10 PM)  Care Management Interventions: Advised patient to keep appointment (Dr Pimentel 2/20/24) (2/15/2024  2:10 PM)    Self Management  What is the home health agency?: n/a (2/15/2024  2:10 PM)  What Durable Medical Equipment (DME) was ordered?: n/a (2/15/2024  2:10 PM)    Patient Teaching  Does the patient have access to their discharge instructions?: Yes (2/15/2024  2:10 PM)  Care Management Interventions: Reviewed instructions with patient (2/15/2024  2:10 PM)  What is the patient's perception of their health status since discharge?: Improving (2/15/2024  2:10 PM)  Is the patient/caregiver able to teach back the hierarchy of who to call/visit for symptoms/problems? PCP, Specialist, Home Health nurse, Urgent Care, ED, 911: Yes (2/15/2024  2:10 PM)  Patient/Caregiver Education Comments: Patient denies and SOB or CP. Reviewed with patient lab work to be completed and a FELIPE patient states understanding. Patient states she final got good rest yesterday reviewed activitiy limitation--patient states they dont have to tell me to be lazy. Patient is aware  of daily weights and monitoring site where incisions were made (2/15/2024  2:10 PM)    Wrap Up  Call End Time: 1414 (2/15/2024  2:10 PM)

## 2024-02-16 LAB
ATRIAL RATE: 51 BPM
ATRIAL RATE: 57 BPM
P AXIS: 71 DEGREES
P AXIS: 72 DEGREES
P OFFSET: 194 MS
P OFFSET: 203 MS
P ONSET: 127 MS
P ONSET: 131 MS
PR INTERVAL: 174 MS
PR INTERVAL: 184 MS
Q ONSET: 218 MS
Q ONSET: 219 MS
QRS COUNT: 10 BEATS
QRS COUNT: 8 BEATS
QRS DURATION: 88 MS
QRS DURATION: 94 MS
QT INTERVAL: 426 MS
QT INTERVAL: 480 MS
QTC CALCULATION(BAZETT): 414 MS
QTC CALCULATION(BAZETT): 442 MS
QTC FREDERICIA: 418 MS
QTC FREDERICIA: 455 MS
R AXIS: 18 DEGREES
R AXIS: 31 DEGREES
T AXIS: 38 DEGREES
T AXIS: 47 DEGREES
T OFFSET: 431 MS
T OFFSET: 459 MS
VENTRICULAR RATE: 51 BPM
VENTRICULAR RATE: 57 BPM

## 2024-02-20 ENCOUNTER — OFFICE VISIT (OUTPATIENT)
Dept: CARDIOLOGY | Facility: CLINIC | Age: 74
End: 2024-02-20
Payer: MEDICARE

## 2024-02-20 VITALS
SYSTOLIC BLOOD PRESSURE: 134 MMHG | WEIGHT: 141 LBS | BODY MASS INDEX: 25.95 KG/M2 | HEART RATE: 76 BPM | HEIGHT: 62 IN | DIASTOLIC BLOOD PRESSURE: 74 MMHG

## 2024-02-20 DIAGNOSIS — Z95.2 S/P TAVR (TRANSCATHETER AORTIC VALVE REPLACEMENT): ICD-10-CM

## 2024-02-20 DIAGNOSIS — E66.3 OVERWEIGHT (BMI 25.0-29.9): ICD-10-CM

## 2024-02-20 DIAGNOSIS — R07.9 CHEST PAIN, UNSPECIFIED TYPE: ICD-10-CM

## 2024-02-20 DIAGNOSIS — I35.0 NONRHEUMATIC AORTIC VALVE STENOSIS: ICD-10-CM

## 2024-02-20 DIAGNOSIS — I10 ESSENTIAL HYPERTENSION: ICD-10-CM

## 2024-02-20 DIAGNOSIS — I25.10 CORONARY ARTERY DISEASE INVOLVING NATIVE CORONARY ARTERY OF NATIVE HEART WITHOUT ANGINA PECTORIS: ICD-10-CM

## 2024-02-20 DIAGNOSIS — Z87.74 STATUS POST AORTIC COARCTATION STENT PLACEMENT: ICD-10-CM

## 2024-02-20 DIAGNOSIS — Z95.828 STATUS POST AORTIC COARCTATION STENT PLACEMENT: ICD-10-CM

## 2024-02-20 DIAGNOSIS — R42 DIZZINESS: ICD-10-CM

## 2024-02-20 DIAGNOSIS — Z98.61 S/P PTCA (PERCUTANEOUS TRANSLUMINAL CORONARY ANGIOPLASTY): ICD-10-CM

## 2024-02-20 PROCEDURE — 1160F RVW MEDS BY RX/DR IN RCRD: CPT | Performed by: INTERNAL MEDICINE

## 2024-02-20 PROCEDURE — 1111F DSCHRG MED/CURRENT MED MERGE: CPT | Performed by: INTERNAL MEDICINE

## 2024-02-20 PROCEDURE — 1159F MED LIST DOCD IN RCRD: CPT | Performed by: INTERNAL MEDICINE

## 2024-02-20 PROCEDURE — 1036F TOBACCO NON-USER: CPT | Performed by: INTERNAL MEDICINE

## 2024-02-20 PROCEDURE — 3078F DIAST BP <80 MM HG: CPT | Performed by: INTERNAL MEDICINE

## 2024-02-20 PROCEDURE — 3075F SYST BP GE 130 - 139MM HG: CPT | Performed by: INTERNAL MEDICINE

## 2024-02-20 PROCEDURE — 99214 OFFICE O/P EST MOD 30 MIN: CPT | Performed by: INTERNAL MEDICINE

## 2024-02-20 PROCEDURE — 1126F AMNT PAIN NOTED NONE PRSNT: CPT | Performed by: INTERNAL MEDICINE

## 2024-02-20 NOTE — PATIENT INSTRUCTIONS
Please bring all medicines, vitamins, and herbal supplements with you when you come to the office.    Prescriptions will not be filled unless you are compliant with your follow up appointments or have a follow up appointment scheduled as per instruction of your physician. Refills should be requested at the time of your visit.     BMI was above normal measurement. Current weight: 64 kg (141 lb)  Weight change since last visit (-) denotes wt loss 3.21 lbs   Weight loss needed to achieve BMI 25: 4.6 Lbs  Weight loss needed to achieve BMI 30: -22.7 Lbs  Provided instructions on dietary changes.

## 2024-02-20 NOTE — LETTER
February 20, 2024     Loree Salmon, APRN-CNP  [address]  [facility] [address]    Patient: Neha Stroud   YOB: 1950   Date of Visit: 2/20/2024       Dear Dr. Loree Salmon, APRN-CNP:    Thank you for referring Neha Stroud to me for evaluation. Below are my notes for this consultation.  If you have questions, please do not hesitate to call me. I look forward to following your patient along with you.       Sincerely,     Ruben Pimentel, DO      CC: No Recipients  ______________________________________________________________________________________    Subjective   Neha Stroud is a 73 y.o. female       Chief Complaint    Follow-up          73-year-old female returns for follow-up 1 week status post Evolut TAVR with Dr. Rosario and Bhumika.  There appear to be no complications.  Details of the discharge summary reviewed.  She has no angina; status post PCI RCA in December 2023 in preparation for her TAVR.    She does have evidence of 2 cm linear incisional dehiscence in the right femoral access area and approximately half centimeter in the left femoral incisional access area.  There is no evidence of infection.  She does have a follow-up with structural heart team via videoconference I believe this week or tomorrow.    In the interim I would recommend simply 4 x 4 gauze with antibiotic ointment over the both areas with secondary healing intention at this time unless structural heart team feels otherwise.  She is otherwise ambulating without any difficulty she still feels weak but overall improved.  Her color is overall better, she has excellent 3+ pulses bilateral femorals and dorsal pedal pulses, no edema and a 1/6 systolic murmur with no regurgitant murmur or sculpted on today's exam    Recommendations: As noted above gauze with antibiotic ointment to both access sites in the groins,  with structural heart team, will follow-up in 8 weeks with nurse practitioner in 6  "months with myself         Review of Systems   Constitutional: Positive for malaise/fatigue.   All other systems reviewed and are negative.           Vitals:    02/20/24 1026   BP: 134/74   BP Location: Left arm   Patient Position: Sitting   Pulse: 76   Weight: 64 kg (141 lb)   Height: 1.575 m (5' 2\")        Objective   Physical Exam  Constitutional:       Appearance: Normal appearance. She is normal weight.   HENT:      Nose: Nose normal.   Neck:      Vascular: No carotid bruit.   Cardiovascular:      Rate and Rhythm: Normal rate.      Pulses:           Posterior tibial pulses are 3+ on the right side and 3+ on the left side.      Heart sounds: Normal heart sounds.   Pulmonary:      Effort: Pulmonary effort is normal.   Abdominal:      General: Bowel sounds are normal.      Palpations: Abdomen is soft.   Genitourinary:     Rectum: Normal.   Musculoskeletal:         General: Normal range of motion.      Cervical back: Normal range of motion.      Right lower leg: No edema.      Left lower leg: No edema.   Skin:     General: Skin is warm and dry.      Comments: Right femoral 2cm linear laceration with subcutaneous exposure.    Neurological:      General: No focal deficit present.      Mental Status: She is alert.   Psychiatric:         Mood and Affect: Mood normal.         Behavior: Behavior normal.         Thought Content: Thought content normal.         Judgment: Judgment normal.         Allergies  Patient has no known allergies.     Current Medications    Current Outpatient Medications:   •  acetaminophen (Tylenol) 500 mg tablet, Take 1-2 tablets (500-1,000 mg) by mouth every 6 hours if needed for mild pain (1 - 3)., Disp: , Rfl:   •  aspirin 81 mg EC tablet, Take 1 tablet (81 mg) by mouth once daily., Disp: , Rfl:   •  atorvastatin (Lipitor) 40 mg tablet, Take 1 tablet (40 mg) by mouth once daily., Disp: 90 tablet, Rfl: 3  •  Brilinta 90 mg tablet, Take 1 tablet (90 mg) by mouth 2 times a day., Disp: , Rfl:   •  " dicyclomine (Bentyl) 20 mg tablet, Take 1 tablet (20 mg) by mouth 2 times a day as needed (stomach cramps)., Disp: , Rfl:   •  ferrous sulfate 325 (65 Fe) MG EC tablet, Take 2 tablets by mouth once daily. Do not crush, chew, or split., Disp: , Rfl:   •  levothyroxine (Synthroid, Levoxyl) 100 mcg tablet, Take 1 tablet (100 mcg) by mouth once daily in the morning. Take before meals., Disp: , Rfl:   •  metoprolol succinate XL (Toprol-XL) 25 mg 24 hr tablet, Take 1 tablet (25 mg) by mouth once daily. Do not crush or chew., Disp: 30 tablet, Rfl: 11  •  rizatriptan (Maxalt) 10 mg tablet, Take 1 tablet (10 mg) by mouth once daily as needed for migraine. May repeat in 2 hours if needed, Disp: , Rfl:   •  lisinopriL-hydrochlorothiazide 20-12.5 mg tablet, Take 0.5 tablets by mouth once daily., Disp: , Rfl:                      Assessment/Plan   1. Status post aortic coarctation stent placement  Follow Up In Cardiology      2. Chest pain, unspecified type  Follow Up In Cardiology      3. Coronary artery disease involving native coronary artery of native heart without angina pectoris        4. S/P PTCA (percutaneous transluminal coronary angioplasty)        5. Essential hypertension  Follow Up In Cardiology      6. Dizziness  Follow Up In Cardiology      7. S/P TAVR (transcatheter aortic valve replacement)        8. Nonrheumatic aortic valve stenosis        9. Overweight (BMI 25.0-29.9)                 Scribe Attestation  By signing my name below, [employer] LPN, Scribe   attest that this documentation has been prepared under the direction and in the presence of Ruben Pimentel DO.     Provider Attestation - Scribe documentation    All medical record entries made by the Scribe were at my direction and personally dictated by me. I have reviewed the chart and agree that the record accurately reflects my personal performance of the history, physical exam, discussion and plan.

## 2024-02-20 NOTE — PROGRESS NOTES
"Subjective   Neha Stroud is a 73 y.o. female       Chief Complaint    Follow-up          73-year-old female returns for follow-up 1 week status post Evolut TAVR with Dr. Lange.  There appear to be no complications.  Details of the discharge summary reviewed.  She has no angina; status post PCI RCA in December 2023 in preparation for her TAVR.    She does have evidence of 2 cm linear incisional dehiscence in the right femoral access area and approximately half centimeter in the left femoral incisional access area.  There is no evidence of infection.  She does have a follow-up with structural heart team via videoconference I believe this week or tomorrow.    In the interim I would recommend simply 4 x 4 gauze with antibiotic ointment over the both areas with secondary healing intention at this time unless structural heart team feels otherwise.  She is otherwise ambulating without any difficulty she still feels weak but overall improved.  Her color is overall better, she has excellent 3+ pulses bilateral femorals and dorsal pedal pulses, no edema and a 1/6 systolic murmur with no regurgitant murmur or sculpted on today's exam    Recommendations: As noted above gauze with antibiotic ointment to both access sites in the groins,  with structural heart team, will follow-up in 8 weeks with nurse practitioner in 6 months with myself         Review of Systems   Constitutional: Positive for malaise/fatigue.   All other systems reviewed and are negative.           Vitals:    02/20/24 1026   BP: 134/74   BP Location: Left arm   Patient Position: Sitting   Pulse: 76   Weight: 64 kg (141 lb)   Height: 1.575 m (5' 2\")        Objective   Physical Exam  Constitutional:       Appearance: Normal appearance. She is normal weight.   HENT:      Nose: Nose normal.   Neck:      Vascular: No carotid bruit.   Cardiovascular:      Rate and Rhythm: Normal rate.      Pulses:           Femoral pulses are 3+ on the " right side and 3+ on the left side.       Dorsalis pedis pulses are 3+ on the right side and 3+ on the left side.      Heart sounds: Murmur heard.      Systolic murmur is present with a grade of 1/6.   Pulmonary:      Effort: Pulmonary effort is normal.   Abdominal:      General: Bowel sounds are normal.      Palpations: Abdomen is soft.   Genitourinary:     Rectum: Normal.   Musculoskeletal:         General: Normal range of motion.      Cervical back: Normal range of motion.      Right lower leg: No edema.      Left lower leg: No edema.   Skin:     General: Skin is warm and dry.      Comments: Right femoral 2cm linear laceration with subcutaneous exposure.   Left femoral 1/2cm linear laceration with subcutaneous exposure.    Neurological:      General: No focal deficit present.      Mental Status: She is alert.   Psychiatric:         Mood and Affect: Mood normal.         Behavior: Behavior normal.         Thought Content: Thought content normal.         Judgment: Judgment normal.         Allergies  Patient has no known allergies.     Current Medications    Current Outpatient Medications:     acetaminophen (Tylenol) 500 mg tablet, Take 1-2 tablets (500-1,000 mg) by mouth every 6 hours if needed for mild pain (1 - 3)., Disp: , Rfl:     aspirin 81 mg EC tablet, Take 1 tablet (81 mg) by mouth once daily., Disp: , Rfl:     atorvastatin (Lipitor) 40 mg tablet, Take 1 tablet (40 mg) by mouth once daily., Disp: 90 tablet, Rfl: 3    Brilinta 90 mg tablet, Take 1 tablet (90 mg) by mouth 2 times a day., Disp: , Rfl:     dicyclomine (Bentyl) 20 mg tablet, Take 1 tablet (20 mg) by mouth 2 times a day as needed (stomach cramps)., Disp: , Rfl:     ferrous sulfate 325 (65 Fe) MG EC tablet, Take 2 tablets by mouth once daily. Do not crush, chew, or split., Disp: , Rfl:     levothyroxine (Synthroid, Levoxyl) 100 mcg tablet, Take 1 tablet (100 mcg) by mouth once daily in the morning. Take before meals., Disp: , Rfl:     metoprolol  succinate XL (Toprol-XL) 25 mg 24 hr tablet, Take 1 tablet (25 mg) by mouth once daily. Do not crush or chew., Disp: 30 tablet, Rfl: 11    rizatriptan (Maxalt) 10 mg tablet, Take 1 tablet (10 mg) by mouth once daily as needed for migraine. May repeat in 2 hours if needed, Disp: , Rfl:                      Assessment/Plan   1. Status post aortic coarctation stent placement  Follow Up In Cardiology      2. Chest pain, unspecified type  Follow Up In Cardiology      3. Coronary artery disease involving native coronary artery of native heart without angina pectoris  Follow Up In Cardiology    Follow Up In Cardiology    Referral to Cardiac Rehab      4. S/P PTCA (percutaneous transluminal coronary angioplasty)  Follow Up In Cardiology    Referral to Cardiac Rehab      5. Essential hypertension  Follow Up In Cardiology      6. Dizziness  Follow Up In Cardiology      7. S/P TAVR (transcatheter aortic valve replacement)        8. Nonrheumatic aortic valve stenosis        9. Overweight (BMI 25.0-29.9)                 Scribe Attestation  By signing my name below, I, Be Navarro LPNibwilliam   attest that this documentation has been prepared under the direction and in the presence of Ruben Pmientel DO.     Provider Attestation - Scribe documentation    All medical record entries made by the Scribe were at my direction and personally dictated by me. I have reviewed the chart and agree that the record accurately reflects my personal performance of the history, physical exam, discussion and plan.

## 2024-02-23 ENCOUNTER — APPOINTMENT (OUTPATIENT)
Dept: VASCULAR MEDICINE | Facility: HOSPITAL | Age: 74
DRG: 920 | End: 2024-02-23
Payer: MEDICARE

## 2024-02-23 ENCOUNTER — CLINICAL SUPPORT (OUTPATIENT)
Dept: CARDIOLOGY | Facility: HOSPITAL | Age: 74
DRG: 920 | End: 2024-02-23
Payer: MEDICARE

## 2024-02-23 ENCOUNTER — APPOINTMENT (OUTPATIENT)
Dept: RADIOLOGY | Facility: HOSPITAL | Age: 74
DRG: 920 | End: 2024-02-23
Payer: MEDICARE

## 2024-02-23 ENCOUNTER — HOME HEALTH ADMISSION (OUTPATIENT)
Dept: HOME HEALTH SERVICES | Facility: HOME HEALTH | Age: 74
End: 2024-02-23

## 2024-02-23 ENCOUNTER — HOSPITAL ENCOUNTER (INPATIENT)
Facility: HOSPITAL | Age: 74
LOS: 1 days | Discharge: HOME | DRG: 920 | End: 2024-02-24
Attending: STUDENT IN AN ORGANIZED HEALTH CARE EDUCATION/TRAINING PROGRAM | Admitting: STUDENT IN AN ORGANIZED HEALTH CARE EDUCATION/TRAINING PROGRAM
Payer: MEDICARE

## 2024-02-23 ENCOUNTER — APPOINTMENT (OUTPATIENT)
Dept: CARDIOLOGY | Facility: HOSPITAL | Age: 74
DRG: 920 | End: 2024-02-23
Payer: MEDICARE

## 2024-02-23 ENCOUNTER — DOCUMENTATION (OUTPATIENT)
Dept: HOME HEALTH SERVICES | Facility: HOME HEALTH | Age: 74
End: 2024-02-23

## 2024-02-23 DIAGNOSIS — T88.8XXA OTHER SPECIFIED COMPLICATIONS OF SURGICAL AND MEDICAL CARE, NOT ELSEWHERE CLASSIFIED, INITIAL ENCOUNTER: ICD-10-CM

## 2024-02-23 DIAGNOSIS — T88.8XXS OTHER SPECIFIED COMPLICATIONS OF SURGICAL AND MEDICAL CARE, NOT ELSEWHERE CLASSIFIED, SEQUELA: ICD-10-CM

## 2024-02-23 DIAGNOSIS — R11.0 NAUSEA: ICD-10-CM

## 2024-02-23 DIAGNOSIS — Z95.2 S/P TAVR (TRANSCATHETER AORTIC VALVE REPLACEMENT): ICD-10-CM

## 2024-02-23 DIAGNOSIS — I35.0 AORTIC STENOSIS, SEVERE: Primary | ICD-10-CM

## 2024-02-23 DIAGNOSIS — Z98.61 S/P PTCA (PERCUTANEOUS TRANSLUMINAL CORONARY ANGIOPLASTY): ICD-10-CM

## 2024-02-23 LAB
ABO GROUP (TYPE) IN BLOOD: NORMAL
ALBUMIN SERPL BCP-MCNC: 3.8 G/DL (ref 3.4–5)
ALP SERPL-CCNC: 73 U/L (ref 33–136)
ALT SERPL W P-5'-P-CCNC: 20 U/L (ref 7–45)
ANION GAP SERPL CALC-SCNC: 14 MMOL/L (ref 10–20)
ANTIBODY SCREEN: NORMAL
AST SERPL W P-5'-P-CCNC: 22 U/L (ref 9–39)
ATRIAL RATE: 78 BPM
BASOPHILS # BLD AUTO: 0.04 X10*3/UL (ref 0–0.1)
BASOPHILS NFR BLD AUTO: 0.7 %
BILIRUB SERPL-MCNC: 0.7 MG/DL (ref 0–1.2)
BUN SERPL-MCNC: 10 MG/DL (ref 6–23)
CALCIUM SERPL-MCNC: 9.3 MG/DL (ref 8.6–10.6)
CHLORIDE SERPL-SCNC: 98 MMOL/L (ref 98–107)
CHLORIDE UR-SCNC: 113 MMOL/L
CHLORIDE/CREATININE (MMOL/G) IN URINE: 177 MMOL/G CREAT (ref 38–318)
CO2 SERPL-SCNC: 22 MMOL/L (ref 21–32)
CREAT SERPL-MCNC: 0.8 MG/DL (ref 0.5–1.05)
CREAT UR-MCNC: 63.8 MG/DL (ref 20–320)
EGFRCR SERPLBLD CKD-EPI 2021: 77 ML/MIN/1.73M*2
EOSINOPHIL # BLD AUTO: 0.52 X10*3/UL (ref 0–0.4)
EOSINOPHIL NFR BLD AUTO: 9.3 %
ERYTHROCYTE [DISTWIDTH] IN BLOOD BY AUTOMATED COUNT: 14.1 % (ref 11.5–14.5)
GLUCOSE SERPL-MCNC: 84 MG/DL (ref 74–99)
HCT VFR BLD AUTO: 30.9 % (ref 36–46)
HGB BLD-MCNC: 10.2 G/DL (ref 12–16)
IMM GRANULOCYTES # BLD AUTO: 0.02 X10*3/UL (ref 0–0.5)
IMM GRANULOCYTES NFR BLD AUTO: 0.4 % (ref 0–0.9)
INR PPP: 1 (ref 0.9–1.1)
LYMPHOCYTES # BLD AUTO: 1.07 X10*3/UL (ref 0.8–3)
LYMPHOCYTES NFR BLD AUTO: 19.1 %
MAGNESIUM SERPL-MCNC: 1.81 MG/DL (ref 1.6–2.4)
MCH RBC QN AUTO: 30.2 PG (ref 26–34)
MCHC RBC AUTO-ENTMCNC: 33 G/DL (ref 32–36)
MCV RBC AUTO: 91 FL (ref 80–100)
MONOCYTES # BLD AUTO: 0.41 X10*3/UL (ref 0.05–0.8)
MONOCYTES NFR BLD AUTO: 7.3 %
NEUTROPHILS # BLD AUTO: 3.53 X10*3/UL (ref 1.6–5.5)
NEUTROPHILS NFR BLD AUTO: 63.2 %
NRBC BLD-RTO: 0 /100 WBCS (ref 0–0)
OSMOLALITY SERPL: 265 MOSM/KG (ref 280–300)
P AXIS: 74 DEGREES
P OFFSET: 195 MS
P ONSET: 129 MS
PLATELET # BLD AUTO: 326 X10*3/UL (ref 150–450)
POTASSIUM SERPL-SCNC: 3.7 MMOL/L (ref 3.5–5.3)
POTASSIUM UR-SCNC: 46 MMOL/L
POTASSIUM/CREAT UR-RTO: 72 MMOL/G CREAT
PR INTERVAL: 182 MS
PROT SERPL-MCNC: 6.1 G/DL (ref 6.4–8.2)
PROTHROMBIN TIME: 11.3 SECONDS (ref 9.8–12.8)
Q ONSET: 220 MS
QRS COUNT: 12 BEATS
QRS DURATION: 88 MS
QT INTERVAL: 386 MS
QTC CALCULATION(BAZETT): 440 MS
QTC FREDERICIA: 421 MS
R AXIS: 43 DEGREES
RBC # BLD AUTO: 3.38 X10*6/UL (ref 4–5.2)
RH FACTOR (ANTIGEN D): NORMAL
SODIUM SERPL-SCNC: 130 MMOL/L (ref 136–145)
SODIUM UR-SCNC: 113 MMOL/L
SODIUM/CREAT UR-RTO: 177 MMOL/G CREAT
T AXIS: 56 DEGREES
T OFFSET: 413 MS
VENTRICULAR RATE: 78 BPM
WBC # BLD AUTO: 5.6 X10*3/UL (ref 4.4–11.3)

## 2024-02-23 PROCEDURE — 2500000002 HC RX 250 W HCPCS SELF ADMINISTERED DRUGS (ALT 637 FOR MEDICARE OP, ALT 636 FOR OP/ED): Mod: MUE | Performed by: STUDENT IN AN ORGANIZED HEALTH CARE EDUCATION/TRAINING PROGRAM

## 2024-02-23 PROCEDURE — 85025 COMPLETE CBC W/AUTO DIFF WBC: CPT | Performed by: STUDENT IN AN ORGANIZED HEALTH CARE EDUCATION/TRAINING PROGRAM

## 2024-02-23 PROCEDURE — 2500000001 HC RX 250 WO HCPCS SELF ADMINISTERED DRUGS (ALT 637 FOR MEDICARE OP)

## 2024-02-23 PROCEDURE — 36415 COLL VENOUS BLD VENIPUNCTURE: CPT | Performed by: STUDENT IN AN ORGANIZED HEALTH CARE EDUCATION/TRAINING PROGRAM

## 2024-02-23 PROCEDURE — 2500000001 HC RX 250 WO HCPCS SELF ADMINISTERED DRUGS (ALT 637 FOR MEDICARE OP): Performed by: STUDENT IN AN ORGANIZED HEALTH CARE EDUCATION/TRAINING PROGRAM

## 2024-02-23 PROCEDURE — 85610 PROTHROMBIN TIME: CPT | Performed by: STUDENT IN AN ORGANIZED HEALTH CARE EDUCATION/TRAINING PROGRAM

## 2024-02-23 PROCEDURE — 2500000004 HC RX 250 GENERAL PHARMACY W/ HCPCS (ALT 636 FOR OP/ED): Performed by: STUDENT IN AN ORGANIZED HEALTH CARE EDUCATION/TRAINING PROGRAM

## 2024-02-23 PROCEDURE — 80053 COMPREHEN METABOLIC PANEL: CPT | Performed by: STUDENT IN AN ORGANIZED HEALTH CARE EDUCATION/TRAINING PROGRAM

## 2024-02-23 PROCEDURE — 83935 ASSAY OF URINE OSMOLALITY: CPT

## 2024-02-23 PROCEDURE — 93926 LOWER EXTREMITY STUDY: CPT | Performed by: INTERNAL MEDICINE

## 2024-02-23 PROCEDURE — 2500000004 HC RX 250 GENERAL PHARMACY W/ HCPCS (ALT 636 FOR OP/ED)

## 2024-02-23 PROCEDURE — 83930 ASSAY OF BLOOD OSMOLALITY: CPT

## 2024-02-23 PROCEDURE — 99223 1ST HOSP IP/OBS HIGH 75: CPT | Performed by: STUDENT IN AN ORGANIZED HEALTH CARE EDUCATION/TRAINING PROGRAM

## 2024-02-23 PROCEDURE — 93005 ELECTROCARDIOGRAM TRACING: CPT

## 2024-02-23 PROCEDURE — 71045 X-RAY EXAM CHEST 1 VIEW: CPT

## 2024-02-23 PROCEDURE — 86850 RBC ANTIBODY SCREEN: CPT | Performed by: STUDENT IN AN ORGANIZED HEALTH CARE EDUCATION/TRAINING PROGRAM

## 2024-02-23 PROCEDURE — 83735 ASSAY OF MAGNESIUM: CPT | Performed by: STUDENT IN AN ORGANIZED HEALTH CARE EDUCATION/TRAINING PROGRAM

## 2024-02-23 PROCEDURE — 71045 X-RAY EXAM CHEST 1 VIEW: CPT | Performed by: RADIOLOGY

## 2024-02-23 PROCEDURE — 82436 ASSAY OF URINE CHLORIDE: CPT

## 2024-02-23 PROCEDURE — 1100000001 HC PRIVATE ROOM DAILY

## 2024-02-23 PROCEDURE — 2500000002 HC RX 250 W HCPCS SELF ADMINISTERED DRUGS (ALT 637 FOR MEDICARE OP, ALT 636 FOR OP/ED): Performed by: STUDENT IN AN ORGANIZED HEALTH CARE EDUCATION/TRAINING PROGRAM

## 2024-02-23 PROCEDURE — 93926 LOWER EXTREMITY STUDY: CPT

## 2024-02-23 RX ORDER — LISINOPRIL AND HYDROCHLOROTHIAZIDE 12.5; 2 MG/1; MG/1
1 TABLET ORAL DAILY
COMMUNITY
Start: 2024-02-21

## 2024-02-23 RX ORDER — HYDROCHLOROTHIAZIDE 25 MG/1
6.25 TABLET ORAL DAILY
Status: DISCONTINUED | OUTPATIENT
Start: 2024-02-23 | End: 2024-02-24 | Stop reason: HOSPADM

## 2024-02-23 RX ORDER — METOPROLOL SUCCINATE 25 MG/1
25 TABLET, EXTENDED RELEASE ORAL DAILY
Status: DISCONTINUED | OUTPATIENT
Start: 2024-02-23 | End: 2024-02-24 | Stop reason: HOSPADM

## 2024-02-23 RX ORDER — ATORVASTATIN CALCIUM 40 MG/1
40 TABLET, FILM COATED ORAL DAILY
Status: DISCONTINUED | OUTPATIENT
Start: 2024-02-23 | End: 2024-02-24 | Stop reason: HOSPADM

## 2024-02-23 RX ORDER — ONDANSETRON HYDROCHLORIDE 2 MG/ML
INJECTION, SOLUTION INTRAVENOUS
Status: COMPLETED
Start: 2024-02-23 | End: 2024-02-23

## 2024-02-23 RX ORDER — ASPIRIN 81 MG/1
81 TABLET ORAL DAILY
Status: DISCONTINUED | OUTPATIENT
Start: 2024-02-23 | End: 2024-02-24 | Stop reason: HOSPADM

## 2024-02-23 RX ORDER — TRAMADOL HYDROCHLORIDE 50 MG/1
25 TABLET ORAL ONCE
Status: COMPLETED | OUTPATIENT
Start: 2024-02-23 | End: 2024-02-23

## 2024-02-23 RX ORDER — RIZATRIPTAN BENZOATE 10 MG/1
10 TABLET ORAL DAILY PRN
Status: DISCONTINUED | OUTPATIENT
Start: 2024-02-23 | End: 2024-02-23

## 2024-02-23 RX ORDER — LISINOPRIL 10 MG/1
10 TABLET ORAL DAILY
Status: DISCONTINUED | OUTPATIENT
Start: 2024-02-23 | End: 2024-02-24 | Stop reason: HOSPADM

## 2024-02-23 RX ORDER — MAGNESIUM SULFATE HEPTAHYDRATE 40 MG/ML
2 INJECTION, SOLUTION INTRAVENOUS ONCE
Status: COMPLETED | OUTPATIENT
Start: 2024-02-24 | End: 2024-02-24

## 2024-02-23 RX ORDER — LEVOTHYROXINE SODIUM 100 UG/1
100 TABLET ORAL
Status: DISCONTINUED | OUTPATIENT
Start: 2024-02-23 | End: 2024-02-24 | Stop reason: HOSPADM

## 2024-02-23 RX ORDER — FERROUS SULFATE 325(65) MG
65 TABLET, DELAYED RELEASE (ENTERIC COATED) ORAL DAILY
Status: DISCONTINUED | OUTPATIENT
Start: 2024-02-23 | End: 2024-02-24 | Stop reason: HOSPADM

## 2024-02-23 RX ORDER — ONDANSETRON HYDROCHLORIDE 2 MG/ML
4 INJECTION, SOLUTION INTRAVENOUS EVERY 6 HOURS PRN
Status: DISCONTINUED | OUTPATIENT
Start: 2024-02-23 | End: 2024-02-24 | Stop reason: HOSPADM

## 2024-02-23 RX ORDER — ACETAMINOPHEN 325 MG/1
650 TABLET ORAL EVERY 6 HOURS PRN
Status: DISCONTINUED | OUTPATIENT
Start: 2024-02-23 | End: 2024-02-24 | Stop reason: HOSPADM

## 2024-02-23 RX ORDER — DICYCLOMINE HYDROCHLORIDE 10 MG/1
20 CAPSULE ORAL 2 TIMES DAILY PRN
Status: DISCONTINUED | OUTPATIENT
Start: 2024-02-23 | End: 2024-02-24 | Stop reason: HOSPADM

## 2024-02-23 RX ORDER — SUMATRIPTAN 50 MG/1
50 TABLET, FILM COATED ORAL ONCE
Status: COMPLETED | OUTPATIENT
Start: 2024-02-23 | End: 2024-02-23

## 2024-02-23 RX ADMIN — HYDROCHLOROTHIAZIDE 6.25 MG: 25 TABLET ORAL at 08:24

## 2024-02-23 RX ADMIN — ONDANSETRON 4 MG: 2 INJECTION INTRAMUSCULAR; INTRAVENOUS at 14:50

## 2024-02-23 RX ADMIN — ACETAMINOPHEN 650 MG: 325 TABLET ORAL at 08:23

## 2024-02-23 RX ADMIN — LISINOPRIL 10 MG: 10 TABLET ORAL at 08:24

## 2024-02-23 RX ADMIN — ONDANSETRON 4 MG: 2 INJECTION INTRAMUSCULAR; INTRAVENOUS at 05:18

## 2024-02-23 RX ADMIN — SUMATRIPTAN SUCCINATE 50 MG: 50 TABLET ORAL at 06:25

## 2024-02-23 RX ADMIN — LEVOTHYROXINE SODIUM 100 MCG: 100 TABLET ORAL at 06:25

## 2024-02-23 RX ADMIN — METOPROLOL SUCCINATE 25 MG: 25 TABLET, EXTENDED RELEASE ORAL at 08:24

## 2024-02-23 RX ADMIN — TICAGRELOR 90 MG: 90 TABLET ORAL at 08:24

## 2024-02-23 RX ADMIN — FERROUS SULFATE TAB EC 325 MG (65 MG FE EQUIVALENT) 1 TABLET: 325 (65 FE) TABLET DELAYED RESPONSE at 08:24

## 2024-02-23 RX ADMIN — ACETAMINOPHEN 650 MG: 325 TABLET ORAL at 14:50

## 2024-02-23 RX ADMIN — ASPIRIN 81 MG: 81 TABLET, COATED ORAL at 08:24

## 2024-02-23 RX ADMIN — ACETAMINOPHEN 650 MG: 325 TABLET ORAL at 20:42

## 2024-02-23 RX ADMIN — TICAGRELOR 90 MG: 90 TABLET ORAL at 20:39

## 2024-02-23 RX ADMIN — ATORVASTATIN CALCIUM 40 MG: 40 TABLET, FILM COATED ORAL at 08:24

## 2024-02-23 RX ADMIN — TRAMADOL HYDROCHLORIDE 25 MG: 50 TABLET, COATED ORAL at 17:44

## 2024-02-23 SDOH — SOCIAL STABILITY: SOCIAL INSECURITY: ARE YOU OR HAVE YOU BEEN THREATENED OR ABUSED PHYSICALLY, EMOTIONALLY, OR SEXUALLY BY ANYONE?: NO

## 2024-02-23 SDOH — SOCIAL STABILITY: SOCIAL INSECURITY: HAVE YOU HAD THOUGHTS OF HARMING ANYONE ELSE?: NO

## 2024-02-23 SDOH — SOCIAL STABILITY: SOCIAL INSECURITY: HAS ANYONE EVER THREATENED TO HURT YOUR FAMILY OR YOUR PETS?: NO

## 2024-02-23 SDOH — SOCIAL STABILITY: SOCIAL INSECURITY: WERE YOU ABLE TO COMPLETE ALL THE BEHAVIORAL HEALTH SCREENINGS?: YES

## 2024-02-23 SDOH — SOCIAL STABILITY: SOCIAL INSECURITY: DO YOU FEEL ANYONE HAS EXPLOITED OR TAKEN ADVANTAGE OF YOU FINANCIALLY OR OF YOUR PERSONAL PROPERTY?: NO

## 2024-02-23 SDOH — SOCIAL STABILITY: SOCIAL INSECURITY: ABUSE: ADULT

## 2024-02-23 SDOH — SOCIAL STABILITY: SOCIAL INSECURITY: ARE THERE ANY APPARENT SIGNS OF INJURIES/BEHAVIORS THAT COULD BE RELATED TO ABUSE/NEGLECT?: NO

## 2024-02-23 SDOH — SOCIAL STABILITY: SOCIAL INSECURITY: DO YOU FEEL UNSAFE GOING BACK TO THE PLACE WHERE YOU ARE LIVING?: NO

## 2024-02-23 SDOH — SOCIAL STABILITY: SOCIAL INSECURITY: DOES ANYONE TRY TO KEEP YOU FROM HAVING/CONTACTING OTHER FRIENDS OR DOING THINGS OUTSIDE YOUR HOME?: NO

## 2024-02-23 ASSESSMENT — COGNITIVE AND FUNCTIONAL STATUS - GENERAL
MOBILITY SCORE: 24
DAILY ACTIVITIY SCORE: 24
DAILY ACTIVITIY SCORE: 24
MOBILITY SCORE: 24
DAILY ACTIVITIY SCORE: 24
PATIENT BASELINE BEDBOUND: NO
MOBILITY SCORE: 24

## 2024-02-23 ASSESSMENT — LIFESTYLE VARIABLES
AUDIT-C TOTAL SCORE: 3
AUDIT-C TOTAL SCORE: 3
HOW OFTEN DURING THE LAST YEAR HAVE YOU HAD A FEELING OF GUILT OR REMORSE AFTER DRINKING: NEVER
AUDIT TOTAL SCORE: 3
HOW OFTEN DURING THE LAST YEAR HAVE YOU NEEDED AN ALCOHOLIC DRINK FIRST THING IN THE MORNING TO GET YOURSELF GOING AFTER A NIGHT OF HEAVY DRINKING: NEVER
HOW OFTEN DO YOU HAVE 6 OR MORE DRINKS ON ONE OCCASION: NEVER
HAVE YOU OR SOMEONE ELSE BEEN INJURED AS A RESULT OF YOUR DRINKING: NO
HOW MANY STANDARD DRINKS CONTAINING ALCOHOL DO YOU HAVE ON A TYPICAL DAY: 1 OR 2
HOW OFTEN DO YOU HAVE A DRINK CONTAINING ALCOHOL: 2-3 TIMES A WEEK
HAS A RELATIVE, FRIEND, DOCTOR, OR ANOTHER HEALTH PROFESSIONAL EXPRESSED CONCERN ABOUT YOUR DRINKING OR SUGGESTED YOU CUT DOWN: NO
AUDIT TOTAL SCORE: 0
SKIP TO QUESTIONS 9-10: 1
HOW OFTEN DURING THE LAST YEAR HAVE YOU FAILED TO DO WHAT WAS NORMALLY EXPECTED FROM YOU BECAUSE OF DRINKING: NEVER
PRESCIPTION_ABUSE_PAST_12_MONTHS: NO
HOW OFTEN DURING THE LAST YEAR HAVE YOU FOUND THAT YOU WERE NOT ABLE TO STOP DRINKING ONCE YOU HAD STARTED: NEVER
SUBSTANCE_ABUSE_PAST_12_MONTHS: NO
HOW OFTEN DURING THE LAST YEAR HAVE YOU BEEN UNABLE TO REMEMBER WHAT HAPPENED THE NIGHT BEFORE BECAUSE YOU HAD BEEN DRINKING: NEVER

## 2024-02-23 ASSESSMENT — ACTIVITIES OF DAILY LIVING (ADL)
HEARING - LEFT EAR: FUNCTIONAL
DRESSING YOURSELF: INDEPENDENT
HEARING - RIGHT EAR: FUNCTIONAL
ADEQUATE_TO_COMPLETE_ADL: YES
FEEDING YOURSELF: INDEPENDENT
GROOMING: INDEPENDENT
BATHING: INDEPENDENT
LACK_OF_TRANSPORTATION: NO
TOILETING: INDEPENDENT
WALKS IN HOME: INDEPENDENT
JUDGMENT_ADEQUATE_SAFELY_COMPLETE_DAILY_ACTIVITIES: YES
PATIENT'S MEMORY ADEQUATE TO SAFELY COMPLETE DAILY ACTIVITIES?: YES

## 2024-02-23 ASSESSMENT — PAIN SCALES - GENERAL
PAINLEVEL_OUTOF10: 6
PAINLEVEL_OUTOF10: 7
PAINLEVEL_OUTOF10: 7
PAINLEVEL_OUTOF10: 6
PAINLEVEL_OUTOF10: 5 - MODERATE PAIN

## 2024-02-23 ASSESSMENT — PAIN DESCRIPTION - ORIENTATION: ORIENTATION: RIGHT;LEFT

## 2024-02-23 ASSESSMENT — PAIN - FUNCTIONAL ASSESSMENT
PAIN_FUNCTIONAL_ASSESSMENT: 0-10

## 2024-02-23 ASSESSMENT — PAIN DESCRIPTION - LOCATION
LOCATION: HEAD
LOCATION: FOOT
LOCATION: HEAD

## 2024-02-23 ASSESSMENT — COLUMBIA-SUICIDE SEVERITY RATING SCALE - C-SSRS
1. IN THE PAST MONTH, HAVE YOU WISHED YOU WERE DEAD OR WISHED YOU COULD GO TO SLEEP AND NOT WAKE UP?: NO
2. HAVE YOU ACTUALLY HAD ANY THOUGHTS OF KILLING YOURSELF?: NO
6. HAVE YOU EVER DONE ANYTHING, STARTED TO DO ANYTHING, OR PREPARED TO DO ANYTHING TO END YOUR LIFE?: NO

## 2024-02-23 ASSESSMENT — PATIENT HEALTH QUESTIONNAIRE - PHQ9
1. LITTLE INTEREST OR PLEASURE IN DOING THINGS: NOT AT ALL
SUM OF ALL RESPONSES TO PHQ9 QUESTIONS 1 & 2: 0
2. FEELING DOWN, DEPRESSED OR HOPELESS: NOT AT ALL

## 2024-02-23 ASSESSMENT — PAIN SCALES - WONG BAKER: WONGBAKER_NUMERICALRESPONSE: HURTS LITTLE MORE

## 2024-02-23 NOTE — H&P
"History Of Present Illness:    72 y/o F with a PMH of CAD, AS, HTN, hypothyroidism, osteoporosis who underwent RCA PCI in 12/2023 and then had an episode of syncope resulting in TAVR w/u being expedited. She had an Evolut FX 29mm predil with 20mm Trudil balloon via right Femoral Artery on  2/13/24. She was discharged on 2/14/2024. At the time of her discharge she had no groin concerns. However, over the past 2-3 days she noticed dehiscence of her incisions bilaterally leading her to present to [place] and then she was [place] [facility] [place].    Upon further questions, she denies CP, SOB, PND, orthopnea, RONALDO, or syncope. On examination, she has widespread ecchymoses on her groins and thighs which she says started about 2-3 days ago. There is also a 2 cm by 2 cm area of hardness superior to her right incision which is mildly tender which she says has been for a few days but her desire to seek care was triggered by wound dehiscence.      Last Recorded Vitals:  Vitals:    02/23/24 0222 02/23/24 0245 02/23/24 0345   BP: 176/85  147/90   BP Location: Left arm     Patient Position: Lying     Pulse: 76 72 80   Resp: 18 (!) 8 15   Temp: 36.7 °C (98.1 °F)  36.3 °C (97.3 °F)   TempSrc: Temporal  Temporal   SpO2: 97%  95%   Weight: 60.5 kg (133 lb 6.4 oz)     Height: 1.588 m (5' 2.5\")         Last Labs:  CBC - 2/14/2024:  5:34 AM  5.2 9.5 179    26.8      CMP - 2/14/2024:  5:34 AM  8.9 _ _ --- _   _ _ _ _      PTT - 2/13/2024:  9:22 AM  1.0   11.7 34     No results found for: \"TROPHS\", \"BNP\", \"HGBA1C\", \"LDLCALC\", \"VLDL\"   Last I/O:  No intake/output data recorded.    Past Cardiology Tests (Last 3 Years):  EKG: NSR  Echo:  1. Left ventricular systolic function is normal with a 65% estimated ejection fraction.   2. There is a transcatheter aortic valve replacement.   3. Mild tre-TAVR aortic valve regurgitation.    Past Medical History:  She has no past medical history on file.    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      Social History:  She reports that she has never smoked. She has never used smokeless tobacco. She reports current alcohol use. She reports that she does not use drugs.    Family History:  Family History   Problem Relation Name Age of Onset    Heart disease Mother      Hypertension Mother      Lung cancer Father      Breast cancer Sister      Dementia Sister      Peripheral vascular disease Sister      Cervical cancer Sister      Atrial fibrillation Sister      Stroke Sister      Atrial fibrillation Brother      Throat cancer Brother          Allergies:  Patient has no known allergies.    Inpatient Medications:  Scheduled medications   Medication Dose Route Frequency    aspirin  81 mg oral Daily    atorvastatin  40 mg oral Daily    ferrous sulfate  65 mg of iron oral Daily    levothyroxine  100 mcg oral Daily before breakfast    metoprolol succinate XL  25 mg oral Daily    SUMAtriptan  50 mg oral Once    ticagrelor  90 mg oral BID     PRN medications   Medication    acetaminophen    dicyclomine     Continuous Medications   Medication Dose Last Rate     Outpatient Medications:  Current Outpatient Medications   Medication Instructions    acetaminophen (Tylenol) 500 mg tablet 1-2 tablets, oral, Every 6 hours PRN    aspirin 81 mg, oral, Daily    atorvastatin (LIPITOR) 40 mg, oral, Daily    Brilinta 90 mg, oral, 2 times daily    dicyclomine (BENTYL) 20 mg, oral, 2 times daily PRN    ferrous sulfate 325 (65 Fe) MG EC tablet 2 tablets, oral, Daily, Do not crush, chew, or split.    levothyroxine (SYNTHROID, LEVOXYL) 100  mcg, oral, Daily before breakfast    metoprolol succinate XL (TOPROL-XL) 25 mg, oral, Daily, Do not crush or chew.    rizatriptan (MAXALT) 10 mg, oral, Daily PRN, May repeat in 2 hours if needed       Physical Exam:  HEENT: PEERL, EOMI, MMM  CARDS: s1/s2, systolic murmur, no RONALDO, no JVD  PULM: CTAB  ABD: soft, non-tender  Ext: Right groin: About 3 cm long incision which appears to be closing with secondary intention and has granulation tissue. Superior to this is about a 2 cm x 2 cm area of hardness which is mildly tender and per patient has not grown in the last two days. Left groin: About 2 cm long incision which appears to be healing well with mild granulation tissue and hard knot above which is likely Perclose. Widespread ecchymoses present in the b/l groins and thighs which are soft and non-tender. Pulses intact.      Assessment/Plan   74 y/o F with a PMH of CAD, AS, HTN, hypothyroidism, osteoporosis who underwent RCA PCI in 12/2023 and TAVR in 2/13/24 who is now presenting due to c/f groin wound dehiscence.     #Groin Wound Dehiscence  #? Right hematoma  Patient had TAVR on 2/13/24 with b/l groin closure with Perclose.  Right groin: About 3 cm long incision which appears to be closing with secondary intention and has granulation tissue. Superior to this is about a 2 cm x 2 cm area of hardness which is mildly tender and per patient has not grown in the last two days. This is likely a hematoma that is no longer growing vs. Thrombosed pseudoaneurysm. Left groin: About 2 cm long incision which appears to be healing well with mild granulation tissue and hard knot above which is likely Perclose. Widespread ecchymoses present in the b/l groins and thighs which are soft and non-tender. Pulses intact.   -currently no c/f active bleeding but will follow-up hemoglobin  -femoral US in the AM  -structural consult in the AM  -consider vascular surgery consult depending on results of US    #Recent TAVR  #Recent RCA  PCI  -continue ASA, Ticagrelor    #HTN  -continue Lisinopril - hydrochlorothiazide 10/6.25 (on combo pill at home)    #Hypthyroidism  -continue Synthroid    F: PRN  E: PRN  N: Adult General     DVT: N/A  GI: N/A           Code Status:  Full Code    I spent 30 minutes in the professional and overall care of this patient.        Pantera Estrada MD

## 2024-02-23 NOTE — PROGRESS NOTES
2/23/2024 Care coordination  Neha Stroud is a 72 y/o F with a PMH of CAD, AS, HTN, hypothyroidism, osteoporosis who underwent RCA PCI in 12/2023 and then had an episode of syncope resulting in TAVR w/u being expedited. She had an Evolut FX 29mm predil with 20mm Trudil balloon via right Femoral Artery on  2/13/24. She was discharged on 2/14/2024. At the time of her discharge she had no groin concerns. However, over the past 2-3 days she noticed dehiscence of her incisions bilaterally leading her to present to [place] and then she was transferred [place] [facility] [place]. Ad[facility] to [facility] [place] for further management.    Discussed with pt regarding [facility] for wound care.  [facility].  Referral initiated via [facility].   Referral also sent to [facility] as a back up.  ADDENDUM'  [facility] can accept, weekend TTC to send finals.  Team and pt aware .

## 2024-02-23 NOTE — PROGRESS NOTES
Spiritual Care Visit    Clinical Encounter Type  Visited With: Patient not available  Routine Visit: Introduction  Continue Visiting: Yes  Referral From: Nurse  Referral To:      made an introductory visit to patient ,however, patient was unavailable. If there is an emergency please page #[id number]. This  will follow up on Monday Feb 26.

## 2024-02-23 NOTE — PROGRESS NOTES
"Subjective Data:  Patient seen this AM, endorses suprapubic pain and BL groin discomfort. She endorses nausea X2 days with episodes of diarrhea yesterday. Structural heart informed of admission. Right groin ultrasound pending to rule out pseudoaneurysm.     - right groin U/S pending to rule out pseudoaneurysm  - BL groins clean with no active drainage, + hematoma both sides. +tenderness groins and suprapubic area  - wound care consulted for groin care recs  - structural heart aware of admission and saw patient; agree with U/S plan     Overnight Events:    Admitted overnight    Objective Data:  Last Recorded Vitals:  Vitals:    02/23/24 0345 02/23/24 0450 02/23/24 0700 02/23/24 1153   BP: 147/90 148/82 141/68 138/70   BP Location:       Patient Position:       Pulse: 80 76 69 72   Resp: 15 14 10 17   Temp: 36.3 °C (97.3 °F) 36.3 °C (97.3 °F) 36.6 °C (97.9 °F) 36.6 °C (97.9 °F)   TempSrc: Temporal Temporal     SpO2: 95%   98%   Weight:  60.5 kg (133 lb 6.1 oz)     Height:  1.575 m (5' 2\")         Last Labs:  CBC - 2/23/2024:  8:01 AM  5.6 10.2 326    30.9      CMP - 2/23/2024:  8:01 AM  9.3 6.1 22 --- 0.7   _ 3.8 20 73      PTT - 2/13/2024:  9:22 AM  1.0   11.3 34     No results found for: \"TROPHS\", \"BNP\", \"HGBA1C\", \"LDLCALC\", \"VLDL\"   Last I/O:  No intake/output data recorded.    Inpatient Medications:  Scheduled medications   Medication Dose Route Frequency    aspirin  81 mg oral Daily    atorvastatin  40 mg oral Daily    ferrous sulfate  65 mg of iron oral Daily    hydroCHLOROthiazide  6.25 mg oral Daily    levothyroxine  100 mcg oral Daily before breakfast    lisinopril  10 mg oral Daily    metoprolol succinate XL  25 mg oral Daily    ticagrelor  90 mg oral BID     PRN medications   Medication    acetaminophen    dicyclomine    ondansetron     Continuous Medications   Medication Dose Last Rate       Physical Exam:  Physical Exam  Constitutional:       Appearance: Normal appearance.   HENT:      Mouth/Throat:      " Mouth: Mucous membranes are moist.   Eyes:      Extraocular Movements: Extraocular movements intact.   Cardiovascular:      Rate and Rhythm: Normal rate and regular rhythm.      Pulses: Normal pulses.           Dorsalis pedis pulses are 2+ on the right side and 2+ on the left side.   Pulmonary:      Breath sounds: Normal breath sounds.   Musculoskeletal:      Right lower leg: No edema.      Left lower leg: No edema.   Skin:     General: Skin is warm and dry.      Comments: Large area of ecchymosis BL groin/ thigh/ suprapubic region    Groin sites stable, no oozing. BL hematoma present   Neurological:      General: No focal deficit present.      Mental Status: She is alert and oriented to person, place, and time.   Psychiatric:         Mood and Affect: Mood normal.         Behavior: Behavior normal.         Assessment/Plan   Neha Stroud is a 72 y/o F with a PMH of CAD, AS, HTN, hypothyroidism, osteoporosis who underwent RCA PCI in 12/2023 and then had an episode of syncope resulting in TAVR w/u being expedited. She had an Evolut FX 29mm predil with 20mm Trudil balloon via right Femoral Artery on  2/13/24. She was discharged on 2/14/2024. At the time of her discharge she had no groin concerns. However, over the past 2-3 days she noticed dehiscence of her incisions bilaterally leading her to present to [place] and then she was transferred to [facility] for further evaluation. Admitted to [facility] for further management.     Aortic stenosis s/p TAVR on 2/13  C/b Groin Wound Dehiscence, right groin hematoma  - patient noted increasing tenderness and wound dehiscence over last 2-3 days  - b/l groin closure with Perclose  - Right groin: About 3 cm long incision which appears to be closing with secondary intention and has granulation tissue. Superior to this is about a 2 cm x 2 cm area of hardness which is mildly tender and per patient has not grown in the last two days. This is likely a hematoma that is no longer growing vs.  Thrombosed pseudoaneurysm. - Left groin: About 2 cm long incision which appears to be healing well with mild granulation tissue and hard knot above which is likely Perclose. Widespread ecchymoses present in the b/l groins and thighs which are soft and non-tender. Pulses intact.   - currently no c/f active bleeding, Hgb stable at 10.2 on admit  - right femoral US pending to rule put pseudoaneurysm   - wound care consulted for groin care recs  - structural heart aware of admission and saw patient; agree with U/S plan      CAD  Recent RCA PCI  -continue ASA 81 mg daily, Ticagrelor 80 mg BID, metoprolol succ 25 mg daily     HTN  - systolic BP on admit 176/85  - systolic BP range 138-176  -continue Lisinopril - hydrochlorothiazide 10/6.25 (on combo pill at home)     Hypothyroidism  -continue Synthroid 100 mcg daily    Hyponatremia, chronic  - Na on admit 130  - previously was 134 when here for TAVR  - serum osmolality and urine osmolality pending     Dispo: pending US to rule out pseudoaneurysm, wound care recs     Peripheral IV 02/13/24 20 G Left Antecubital (Active)   Site Assessment Clean;Dry;Intact 02/23/24 1031   Dressing Status Clean;Dry 02/23/24 1031   Number of days: 10     Code Status:  Full Code    JOSE DE JESUS Ghotra-CNP

## 2024-02-23 NOTE — HH CARE COORDINATION
Home Care received a referral for Nursing. Unfortunately, we are unable to accept and process the referral at this time.    Patients, please reach out to the referring provider or your PCP to assist in obtaining an alternative home care agency and/or guidance to meet your needs.    Providers, please reach out to  Home Care with any questions regarding the declined referral.

## 2024-02-23 NOTE — CARE PLAN
The patient's goals for the shift include      The clinical goals for the shift include pt will sleep a minimum of 3 hours by the end of this shift    Problem: Safety - Adult  Goal: Free from fall injury  Outcome: Progressing

## 2024-02-23 NOTE — TREATMENT PLAN
Structural Heart Quick treatment plan    Neha Stroud is a 74yo F with a PMH of CAD with PCI to RCA (12/23), hypertension, dyslipidemia, CKD and anemia. S/p B/L femoral artery (Right primary) Evolut FX 29mm predil with 20mm Trudil balloon via right Femoral Artery on 2/13/24.  Procedure was successful and discharged on POD 1.      Pt presented to her primary cardiologist office yesterday and d/t a right groin wound dehiscence and hematoma the pt was sent to the ER.  Pt was also experiencing nausea vomiting and diarrhea the past several days.      Pt denies SOB, CP, leg swelling.  She stated prior to the other day when the vomiting and diarrhea started she was feeling quite well.     Impression:  Pt appears euvolemic, actually appears on the dehydrated side.  No BLE edema.  Groin assessment with small firm right groin hematoma with ecchymosis spanning in all directions, wound edges are , but the tissue looks healthy, with no signs of infection.  Pt does have a small hematoma over her pubis mons.  Left groin also with a small firm hematoma with ecchymosis spanning in all directions (less than right side).  Both groins without bruit/thrill on assessment.  US showed no signs of PSA, only a small hematoma.    Vitals are stable, afebrile.  Rhythm is stable          Recs:  Continue ASA and Brilinta  No need for anbx for groin wound  Do not close right groin wound - needs daily soap/water cleaning, keep clean and dry (only use non-occlusive dressings)  Monitor hyponatremia   Ok from our standpoint to discharge the pt when deemed medically ready  Structural will follow while admitted      Ramone Segundo MSN, [facility]-BC  Acute Care Nurse Practitioner  Structural Heart / TAVR Team  Structural Pager: [phone]  (Nights) EDH fellow pager: [phone]

## 2024-02-23 NOTE — ED NOTES
Pharmacy Medication History Review    Neha Stroud is a 73 y.o. female admitted for Aortic stenosis, severe. Pharmacy reviewed the patient's [id number] medications and allergies for accuracy.    The list below reflects the updated PTA list. Comments regarding how patient may be taking medications differently can be found in the Admit Orders Activity    Prior to Admission Medications   Prescriptions Last Dose Informant Patient Reported?   Brilinta 90 mg tablet 2/22/2024 Self, Other Yes   Sig: Take 1 tablet (90 mg) by mouth 2 times a day.   acetaminophen (Tylenol) 500 mg tablet 2/22/2024 Self, Other Yes   Sig: Take 1-2 tablets (500-1,000 mg) by mouth every 6 hours if needed for mild pain (1 - 3).   aspirin 81 mg EC tablet 2/22/2024 Self, Other Yes   Sig: Take 1 tablet (81 mg) by mouth once daily.   atorvastatin (Lipitor) 40 mg tablet 2/22/2024 Self, Other No   Sig: Take 1 tablet (40 mg) by mouth once daily.   dicyclomine (Bentyl) 20 mg tablet 2/22/2024 Self, Other Yes   Sig: Take 1 tablet (20 mg) by mouth 2 times a day as needed (stomach cramps).   ferrous sulfate 325 (65 Fe) MG EC tablet 2/22/2024 Self, Other Yes   Sig: Take 2 tablets by mouth once daily. Do not crush, chew, or split.   levothyroxine (Synthroid, Levoxyl) 100 mcg tablet 2/22/2024 Self, Other Yes   Sig: Take 1 tablet (100 mcg) by mouth once daily in the morning. Take before meals.   lisinopriL-hydrochlorothiazide 20-12.5 mg tablet  Self, Other Yes   Sig: Take 1 tablet by mouth once daily.   metoprolol succinate XL (Toprol-XL) 25 mg 24 hr tablet 2/22/2024 Self, Other No   Sig: Take 1 tablet (25 mg) by mouth once daily. Do not crush or chew.   rizatriptan (Maxalt) 10 mg tablet Past Month Self, Other Yes   Sig: Take 1 tablet (10 mg) by mouth once daily as needed for migraine. May repeat in 2 hours if needed      Facility-Administered Medications: None        The list below reflects the updated allergy list. Please review each documented allergy  for additional clarification and justification.  Allergies  Reviewed by Carlita Mendoza RN on 2/23/2024   No Known Allergies         Patient accepts M2B at discharge. Pharmacy has been updated to [facility] pharmacy.    Sources used to complete the med history include medication dispense reports, care everywhere, patient provided medication list, and patient confirmation.    Patient was a great historian of home medications during today's medication reconciliation interview. Changes made to the patient's profile can be found listed below.    Added medications  Lisinopril-hydrochlorothiazide: To note, this medication is prescribed to take one tablet by mouth daily however the patient has been taking half a tablet by mouth daily.    Sylvia Laguna  PGY1 Pharmacy Resident    [facility]  Please reach out via [employer] Secure Chat for questions, or if no response call [employer] or [email or web address] “MedRec”

## 2024-02-23 NOTE — HOSPITAL COURSE
Neha Stroud is a 72 y/o F with a PMH of CAD, AS, HTN, hypothyroidism, osteoporosis who underwent RCA PCI in 12/2023 and then had an episode of syncope resulting in TAVR w/u being expedited. She had an Evolut FX 29mm predil with 20mm Trudil balloon via right Femoral Artery on  2/13/24. She was discharged on 2/14/2024. At the time of her discharge she had no groin concerns. However, over the past 2-3 days she noticed dehiscence of her incisions bilaterally leading her to present to [place] and then she was transferred to [facility] for further evaluation.     Upon further questions, she denies CP, SOB, PND, orthopnea, RONALDO, or syncope. On examination, she has widespread ecchymoses on her groins and thighs which she says started about 2-3 days ago. There is also a 2 cm by 2 cm area of hardness superior to her right incision which is mildly tender which she says has been for a few days but her desire to seek care was triggered by wound dehiscence.      Floor course:    Ultrasound of right groin completed on 2/23, showing no evidence of pseudo aneurysm noted in the right groin. No evidence of DVT in the visualized vessels. There is a hematoma measuring 0.8 cm x 2.0 cm in the right groin and small hematoma noted in left groin.     H/H stable, no evidence of active infection or bleeding.    Structural heart team aware of admission and assessed patient at bedside. Cleared patient for discharge, recommending keeping groin sites clean and dry to allow for healing. Wound care instructions provided to patient at discharge.     Patient offered and declined home care services.     Discharge weight: 60.2 kg    After all labs and VS were reviewed the decision was made that the patient was medically stable for discharge.  The patient was discharged in satisfactory condition.    More than 60 minutes were spent in coordinating patient discharge.

## 2024-02-24 VITALS
OXYGEN SATURATION: 99 % | SYSTOLIC BLOOD PRESSURE: 134 MMHG | HEIGHT: 62 IN | WEIGHT: 132.72 LBS | BODY MASS INDEX: 24.42 KG/M2 | TEMPERATURE: 98.1 F | HEART RATE: 60 BPM | DIASTOLIC BLOOD PRESSURE: 53 MMHG | RESPIRATION RATE: 17 BRPM

## 2024-02-24 PROBLEM — I35.0 AORTIC STENOSIS, SEVERE: Status: RESOLVED | Noted: 2024-02-23 | Resolved: 2024-02-24

## 2024-02-24 LAB
ALBUMIN SERPL BCP-MCNC: 4.1 G/DL (ref 3.4–5)
ANION GAP SERPL CALC-SCNC: 14 MMOL/L (ref 10–20)
BUN SERPL-MCNC: 12 MG/DL (ref 6–23)
CALCIUM SERPL-MCNC: 9.7 MG/DL (ref 8.6–10.6)
CHLORIDE SERPL-SCNC: 97 MMOL/L (ref 98–107)
CO2 SERPL-SCNC: 25 MMOL/L (ref 21–32)
CREAT SERPL-MCNC: 0.84 MG/DL (ref 0.5–1.05)
EGFRCR SERPLBLD CKD-EPI 2021: 73 ML/MIN/1.73M*2
ERYTHROCYTE [DISTWIDTH] IN BLOOD BY AUTOMATED COUNT: 13.8 % (ref 11.5–14.5)
GLUCOSE SERPL-MCNC: 85 MG/DL (ref 74–99)
HCT VFR BLD AUTO: 32.3 % (ref 36–46)
HGB BLD-MCNC: 10.9 G/DL (ref 12–16)
MAGNESIUM SERPL-MCNC: 2.3 MG/DL (ref 1.6–2.4)
MCH RBC QN AUTO: 30.2 PG (ref 26–34)
MCHC RBC AUTO-ENTMCNC: 33.7 G/DL (ref 32–36)
MCV RBC AUTO: 90 FL (ref 80–100)
NRBC BLD-RTO: 0 /100 WBCS (ref 0–0)
OSMOLALITY UR: 476 MOSM/KG (ref 200–1200)
PHOSPHATE SERPL-MCNC: 3.7 MG/DL (ref 2.5–4.9)
PLATELET # BLD AUTO: 384 X10*3/UL (ref 150–450)
POTASSIUM SERPL-SCNC: 4.6 MMOL/L (ref 3.5–5.3)
RBC # BLD AUTO: 3.61 X10*6/UL (ref 4–5.2)
SODIUM SERPL-SCNC: 131 MMOL/L (ref 136–145)
WBC # BLD AUTO: 7.4 X10*3/UL (ref 4.4–11.3)

## 2024-02-24 PROCEDURE — 99239 HOSP IP/OBS DSCHRG MGMT >30: CPT | Performed by: STUDENT IN AN ORGANIZED HEALTH CARE EDUCATION/TRAINING PROGRAM

## 2024-02-24 PROCEDURE — 83735 ASSAY OF MAGNESIUM: CPT

## 2024-02-24 PROCEDURE — 36415 COLL VENOUS BLD VENIPUNCTURE: CPT

## 2024-02-24 PROCEDURE — 80069 RENAL FUNCTION PANEL: CPT

## 2024-02-24 PROCEDURE — 2500000002 HC RX 250 W HCPCS SELF ADMINISTERED DRUGS (ALT 637 FOR MEDICARE OP, ALT 636 FOR OP/ED): Mod: MUE | Performed by: STUDENT IN AN ORGANIZED HEALTH CARE EDUCATION/TRAINING PROGRAM

## 2024-02-24 PROCEDURE — 2500000002 HC RX 250 W HCPCS SELF ADMINISTERED DRUGS (ALT 637 FOR MEDICARE OP, ALT 636 FOR OP/ED): Performed by: STUDENT IN AN ORGANIZED HEALTH CARE EDUCATION/TRAINING PROGRAM

## 2024-02-24 PROCEDURE — 2500000001 HC RX 250 WO HCPCS SELF ADMINISTERED DRUGS (ALT 637 FOR MEDICARE OP): Performed by: STUDENT IN AN ORGANIZED HEALTH CARE EDUCATION/TRAINING PROGRAM

## 2024-02-24 PROCEDURE — 85027 COMPLETE CBC AUTOMATED: CPT

## 2024-02-24 PROCEDURE — 2500000004 HC RX 250 GENERAL PHARMACY W/ HCPCS (ALT 636 FOR OP/ED): Performed by: STUDENT IN AN ORGANIZED HEALTH CARE EDUCATION/TRAINING PROGRAM

## 2024-02-24 RX ORDER — ONDANSETRON 4 MG/1
4 TABLET, ORALLY DISINTEGRATING ORAL EVERY 8 HOURS PRN
Qty: 20 TABLET | Refills: 0 | Status: SHIPPED | OUTPATIENT
Start: 2024-02-24 | End: 2024-04-16 | Stop reason: WASHOUT

## 2024-02-24 RX ADMIN — METOPROLOL SUCCINATE 25 MG: 25 TABLET, EXTENDED RELEASE ORAL at 08:25

## 2024-02-24 RX ADMIN — HYDROCHLOROTHIAZIDE 6.25 MG: 25 TABLET ORAL at 08:24

## 2024-02-24 RX ADMIN — LEVOTHYROXINE SODIUM 100 MCG: 100 TABLET ORAL at 06:44

## 2024-02-24 RX ADMIN — LISINOPRIL 10 MG: 10 TABLET ORAL at 08:25

## 2024-02-24 RX ADMIN — TICAGRELOR 90 MG: 90 TABLET ORAL at 08:25

## 2024-02-24 RX ADMIN — ASPIRIN 81 MG: 81 TABLET, COATED ORAL at 08:25

## 2024-02-24 RX ADMIN — FERROUS SULFATE TAB EC 325 MG (65 MG FE EQUIVALENT) 1 TABLET: 325 (65 FE) TABLET DELAYED RESPONSE at 09:00

## 2024-02-24 RX ADMIN — ATORVASTATIN CALCIUM 40 MG: 40 TABLET, FILM COATED ORAL at 08:25

## 2024-02-24 RX ADMIN — MAGNESIUM SULFATE HEPTAHYDRATE 2 G: 40 INJECTION, SOLUTION INTRAVENOUS at 01:30

## 2024-02-24 ASSESSMENT — COGNITIVE AND FUNCTIONAL STATUS - GENERAL
MOBILITY SCORE: 24
DAILY ACTIVITIY SCORE: 24

## 2024-02-24 ASSESSMENT — PAIN - FUNCTIONAL ASSESSMENT: PAIN_FUNCTIONAL_ASSESSMENT: 0-10

## 2024-02-24 ASSESSMENT — PAIN SCALES - GENERAL: PAINLEVEL_OUTOF10: 0 - NO PAIN

## 2024-02-24 NOTE — DISCHARGE SUMMARY
Discharge Diagnosis  Aortic stenosis, severe    Issues Requiring Follow-Up  Groin incision, hematoma    Test Results Pending At Discharge  Pending Labs       No current pending labs.            Hospital Course  Nhea Stroud is a 74 y/o F with a PMH of CAD, AS, HTN, hypothyroidism, osteoporosis who underwent RCA PCI in 12/2023 and then had an episode of syncope resulting in TAVR w/u being expedited. She had an Evolut FX 29mm predil with 20mm Trudil balloon via right Femoral Artery on  2/13/24. She was discharged on 2/14/2024. At the time of her discharge she had no groin concerns. However, over the past 2-3 days she noticed dehiscence of her incisions bilaterally leading her to present to [place] and then she was transferred to [facility] for further evaluation.     Upon further questions, she denies CP, SOB, PND, orthopnea, RONALDO, or syncope. On examination, she has widespread ecchymoses on her groins and thighs which she says started about 2-3 days ago. There is also a 2 cm by 2 cm area of hardness superior to her right incision which is mildly tender which she says has been for a few days but her desire to seek care was triggered by wound dehiscence.      Floor course:    Ultrasound of right groin completed on 2/23, showing no evidence of pseudo aneurysm noted in the right groin. No evidence of DVT in the visualized vessels. There is a hematoma measuring 0.8 cm x 2.0 cm in the right groin and small hematoma noted in left groin.     H/H stable, no evidence of active infection or bleeding.    Structural heart team aware of admission and assessed patient at bedside. Cleared patient for discharge, recommending keeping groin sites clean and dry to allow for healing. Wound care instructions provided to patient at discharge.     Patient offered and declined home care services.     Discharge weight: 60.2 kg    After all labs and VS were reviewed the decision was made that the patient was medically stable for discharge.  The  patient was discharged in satisfactory condition.    More than 60 minutes were spent in coordinating patient discharge.      Pertinent Physical Exam At Time of Discharge    Physical Exam  Constitutional:       Appearance: Normal appearance.   HENT:      Mouth/Throat:      Mouth: Mucous membranes are moist.   Eyes:      Extraocular Movements: Extraocular movements intact.   Cardiovascular:      Rate and Rhythm: Normal rate and regular rhythm.      Pulses: Normal pulses.           Dorsalis pedis pulses are 2+ on the right side and 2+ on the left side.   Pulmonary:      Breath sounds: Normal breath sounds.   Musculoskeletal:      Right lower leg: No edema.      Left lower leg: No edema.   Skin:     General: Skin is warm and dry.      Comments: Large area of ecchymosis BL groin/ thigh/ suprapubic region     Groin sites stable, no oozing. BL hematoma present   Neurological:      General: No focal deficit present.      Mental Status: She is alert and oriented to person, place, and time.   Psychiatric:         Mood and Affect: Mood normal.         Behavior: Behavior normal.     Home Medications     Medication List      START taking these medications     ondansetron ODT 4 mg disintegrating tablet; Commonly known as:   Zofran-ODT; Take 1 tablet (4 mg) by mouth every 8 hours if needed for   nausea or vomiting.     CONTINUE taking these medications     acetaminophen 500 mg tablet; Commonly known as: Tylenol   aspirin 81 mg EC tablet   atorvastatin 40 mg tablet; Commonly known as: Lipitor; Take 1 tablet (40   mg) by mouth once daily.   Brilinta 90 mg tablet; Generic drug: ticagrelor   dicyclomine 20 mg tablet; Commonly known as: Bentyl   ferrous sulfate 325 (65 Fe) MG EC tablet   levothyroxine 100 mcg tablet; Commonly known as: Synthroid, Levoxyl   lisinopriL-hydrochlorothiazide 20-12.5 mg tablet   metoprolol succinate XL 25 mg 24 hr tablet; Commonly known as:   Toprol-XL; Take 1 tablet (25 mg) by mouth once daily. Do not crush  or   chew.   rizatriptan 10 mg tablet; Commonly known as: Maxalt       Outpatient Follow-Up  Future Appointments   Date Time Provider Department Center   3/7/2024  9:45 AM NATALEE FROST [facility] ROOM 2 [id number] NATALEE Frost   3/14/2024 11:00 AM  TALHA [id number] CARD1 [id number] [facility]   4/16/2024  1:30 PM MEGHANN SeguraCNP [id number] [place]   8/21/2024 10:10 AM Ruben Pimentel DO [id number] [place]   2/14/2025 10:00 AM  TALHA [id number] CARD1 [id number] [facility]       JOSE DE JESUS Curry-CNP

## 2024-02-24 NOTE — DISCHARGE INSTRUCTIONS
Your medications have not changed. Please continue to follow the discharge instructions you were previously provided with after your TAVR. Please take the medications listed on these instructions as prescribed until you are seen at a follow up appointment. It was a pleasure to have met and care for you!     The US performed on your right groin incisions showed no evidence of pseudo aneurysm or DVT. There is a hematoma that will resolve with time.       ####  POST VALVE PROCEDURE DISCHARGE INSTRUCTIONS   ####    - Please weigh yourself daily (first thing in the morning) and record reading  - Please take and record your blood pressure 2 times a day (at least 60-90 mins AFTER you take your meds)  PLEASE HAVE THESE READINGS AVAILABLE DURING YOUR FOLLOW-UP APPOINTMENTS!!    - NO DRIVING OR LIFTING/PULLING/PUSHING GREATER THAN 10 POUNDS FOR 1 WEEK FROM YOUR PROCEDURE DATE.    **** No elective dental procedures or cleanings for 3 months post procedure - You will need dental prophylaxis (oral antibiotic) prior to dental work/cleanings for life *****    Please call the STRUCTURAL HEART TEAM LINE if you have any questions or concerns - [phone]    DIET:  - You may resume your normal diet. However, be mindful of your sodium intake.  Ideally you should try to limit your daily intake of sodium to 2-3g a day      HEART FAILURE SPECIFIC INSTRUCTIONS:   - CALL 911 IF YOU HAVE ANY OF THE SIGNS AND SYMPTOMS OF HEART FAILURE:   1. Chest pain   2. Significant Shortness of breath   3. Fainting.   - Notify your physician immediately if you have shortness of breath; weight gain of 3 lbs. or more; fatigue and loss of energy; swelling of lower extremities or abdomen; dizziness or fainting; change of appetite; and frequent coughing.   - Daily weight on the same scale, same time after voiding and before eating.   - Maintain daily weight log.

## 2024-02-25 NOTE — PROGRESS NOTES
Transitional Care Coordinator Progress Note:   Final HC orders and after visit summary sent to Billy Quispe  via [employer]. Pt was discharged home on 2/24.    Mary Nieto MSN, RN-BC  Transitional Care Coordinator (TCC)  [phone]

## 2024-02-26 ENCOUNTER — PATIENT OUTREACH (OUTPATIENT)
Dept: CARDIOLOGY | Facility: CLINIC | Age: 74
End: 2024-02-26
Payer: MEDICARE

## 2024-02-26 NOTE — PROGRESS NOTES
Discharge Facility: [facility]  Discharge Diagnosis: Resolved aortic stenosis, Groin incision hematoma  Admission Date:  2/23/24  Discharge Date: 2/24/24    PCP Appointment Date: none listed  Specialist Appointment Date: Task sent to Dr Pimentel's office  Hospital Encounter and Summary: Linked     2 calls with messages left and no return call as of this note

## 2024-03-07 ENCOUNTER — HOSPITAL ENCOUNTER (OUTPATIENT)
Dept: CARDIOLOGY | Facility: CLINIC | Age: 74
Discharge: HOME | End: 2024-03-07
Payer: MEDICARE

## 2024-03-07 VITALS
SYSTOLIC BLOOD PRESSURE: 134 MMHG | DIASTOLIC BLOOD PRESSURE: 66 MMHG | BODY MASS INDEX: 25.95 KG/M2 | HEIGHT: 62 IN | WEIGHT: 141 LBS

## 2024-03-07 DIAGNOSIS — Z95.2 S/P TAVR (TRANSCATHETER AORTIC VALVE REPLACEMENT): ICD-10-CM

## 2024-03-07 LAB
AORTIC VALVE MEAN GRADIENT: 8 MMHG
AORTIC VALVE PEAK VELOCITY: 2.08 M/S
AV PEAK GRADIENT: 17.3 MMHG
AVA (PEAK VEL): 3.46 CM2
AVA (VTI): 4.05 CM2
EJECTION FRACTION APICAL 4 CHAMBER: 50
LEFT VENTRICLE INTERNAL DIMENSION DIASTOLE: 3.9 CM (ref 3.5–6)
MITRAL VALVE E/A RATIO: 1.05
MITRAL VALVE E/E' RATIO: 12.2
RIGHT VENTRICLE PEAK SYSTOLIC PRESSURE: 36.9 MMHG

## 2024-03-07 PROCEDURE — 93306 TTE W/DOPPLER COMPLETE: CPT

## 2024-03-07 PROCEDURE — 93306 TTE W/DOPPLER COMPLETE: CPT | Performed by: INTERNAL MEDICINE

## 2024-03-12 ENCOUNTER — PATIENT OUTREACH (OUTPATIENT)
Dept: CARDIOLOGY | Facility: CLINIC | Age: 74
End: 2024-03-12
Payer: MEDICARE

## 2024-03-12 NOTE — PROGRESS NOTES
Unable to reach patient for call back after patient's follow up appointment with PCP.   CELESTEM with call back number for patient to call if needed   If no voicemail available call attempts x 2 were made to contact the patient to assist with any questions or concerns patient may have.

## 2024-03-14 ENCOUNTER — TELEMEDICINE (OUTPATIENT)
Dept: CARDIOLOGY | Facility: CLINIC | Age: 74
End: 2024-03-14
Payer: MEDICARE

## 2024-03-14 DIAGNOSIS — Z95.2 S/P TAVR (TRANSCATHETER AORTIC VALVE REPLACEMENT): Primary | ICD-10-CM

## 2024-03-14 DIAGNOSIS — I51.89 DIASTOLIC DYSFUNCTION: ICD-10-CM

## 2024-03-14 PROBLEM — I35.0 AORTIC STENOSIS: Status: RESOLVED | Noted: 2023-12-13 | Resolved: 2024-03-14

## 2024-03-14 PROCEDURE — 1160F RVW MEDS BY RX/DR IN RCRD: CPT | Performed by: NURSE PRACTITIONER

## 2024-03-14 PROCEDURE — 1159F MED LIST DOCD IN RCRD: CPT | Performed by: NURSE PRACTITIONER

## 2024-03-14 PROCEDURE — 1111F DSCHRG MED/CURRENT MED MERGE: CPT | Performed by: NURSE PRACTITIONER

## 2024-03-14 PROCEDURE — 99214 OFFICE O/P EST MOD 30 MIN: CPT | Performed by: NURSE PRACTITIONER

## 2024-03-14 RX ORDER — AMOXICILLIN 500 MG/1
CAPSULE ORAL
Qty: 4 CAPSULE | Refills: 5 | Status: SHIPPED | OUTPATIENT
Start: 2024-03-14

## 2024-03-14 RX ORDER — FUROSEMIDE 20 MG/1
20 TABLET ORAL AS NEEDED
Qty: 30 TABLET | Refills: 1 | Status: SHIPPED | OUTPATIENT
Start: 2024-03-14 | End: 2024-05-02

## 2024-03-14 NOTE — PROGRESS NOTES
Structural Heart Follow up visit      Neha Stroud is a 73 y.o. female presents today for 1 month follow up s/p TAVR      reports fatigue and BLE edema  Recent Hospitalizations  Yes    Date: 2/23/24  wound dehiscence    patient with   Past Medical History:   Diagnosis Date    Arthritis     Disease of thyroid gland     History of transfusion        No results found for this or any previous visit (from the past 96 hour(s)).     Transthoracic Echo (TTE) Complete    Result Date: 3/7/2024               [address], [address] [facility], [place] [address]         Tel [phone] Fax [phone] TRANSTHORACIC ECHOCARDIOGRAM REPORT  Patient Name:      NEHA STROUD        Reading Physician:    [id number] Ruben Loyola MD Study Date:        3/7/2024              Ordering Provider:    [id number] SHANELL RICHARDSON MRN/PID:           [id number]              Fellow: Accession#:        [id number]          Nurse: Date of Birth/Age: 1950 / 73 years   Sonographer:          Beulah Fallon RDCS, RVT Gender:            F                     Additional Staff: Height:            157.48 cm             Admit Date: Weight:            63.96 kg              Admission Status: BSA / BMI:         1.65 m2 / 25.79 kg/m2 Department Location:  [facility] Blood Pressure: 134 /66 mmHg Study Type:    TRANSTHORACIC ECHO (TTE) COMPLETE Diagnosis/ICD: Presence of prosthetic heart valve-Z95.2 Indication:    #29 Evolut FX TAVR-2/13/2024, CAD, Chest Pain, HTN, 1/6 Systolic                Murmur CPT Codes:     Echo Complete w Full [id number]  Study Detail: The following Echo studies were performed: 2D, M-Mode, Doppler and               color flow.  PHYSICIAN  INTERPRETATION: Left Ventricle: Left ventricular systolic function is normal, with an estimated ejection fraction of 65-70%. There are no regional wall motion abnormalities. The left ventricular cavity size is normal. The left ventricular septal wall thickness is moderately increased. There is mildly increased left ventricular posterior wall thickness. Spectral Doppler shows an impaired relaxation pattern of left ventricular diastolic filling. Left Atrium: The left atrium is moderately dilated. Right Ventricle: The right ventricle is normal in size. There is normal right ventricular global systolic function. Right Atrium: The right atrium is normal in size. Aortic Valve: There is a prosthetic aortic valve present. There is transcatheter aortic valve replacement. Echo findings are consistent with normal aortic valve prosthesis structure and function. There is trivial aortic valve regurgitation. The peak instantaneous gradient of the aortic valve is 17.3 mmHg. The mean gradient of the aortic valve is 8.0 mmHg. Mitral Valve: The mitral valve is mildly thickened. There is trace mitral valve regurgitation. Tricuspid Valve: The tricuspid valve is structurally normal. There is mild tricuspid regurgitation. Pulmonic Valve: The pulmonic valve is not well visualized. There is no indication of pulmonic valve regurgitation. Pericardium: There is no pericardial effusion noted. Aorta: The aortic root is normal.  CONCLUSIONS:  1. Left ventricular systolic function is normal with a 65-70% estimated ejection fraction.  2. Moderately increased left ventricular septal thickness.  3. Spectral Doppler shows an impaired relaxation pattern of left ventricular diastolic filling.  4. The left atrium is moderately dilated.  5. There is a transcatheter aortic valve replacement. QUANTITATIVE DATA SUMMARY: 2D MEASUREMENTS:                           Normal Ranges: Ao Root d:     3.20 cm    (2.0-3.7cm) LAs:           4.10 cm    (2.7-4.0cm) RVIDd:          3.10 cm    (0.9-3.6cm) IVSd:          1.60 cm    (0.6-1.1cm) LVPWd:         1.10 cm    (0.6-1.1cm) LVIDd:         3.90 cm    (3.9-5.9cm) LVIDs:         3.10 cm LV Mass Index: 115.6 g/m2 LV % FS        20.5 % LV SYSTOLIC FUNCTION BY 2D PLANIMETRY (MOD):                     Normal Ranges: EF-A4C View: 50.0 % (>=55%) LV DIASTOLIC FUNCTION:                        Normal Ranges: MV Peak E:    1.05 m/s (0.7-1.2 m/s) MV Peak A:    1.00 m/s (0.42-0.7 m/s) E/A Ratio:    1.05     (1.0-2.2) MV lateral e' 0.09 m/s MV medial e'  0.07 m/s E/e' Ratio:   12.20    (<8.0) MITRAL VALVE:                      Normal Ranges: MV Vmax:    1.18 m/s (<=1.3m/s) MV peak P.6 mmHg (<5mmHg) MV mean P.0 mmHg (<48mmHg) MITRAL INSUFFICIENCY:                      Normal Ranges: MR Vmax: 280.00 cm/s AORTIC VALVE:                                    Normal Ranges: AoV Vmax:                2.08 m/s  (<=1.7m/s) AoV Peak P.3 mmHg (<20mmHg) AoV Mean P.0 mmHg  (1.7-11.5mmHg) LVOT Max Saji:            1.09 m/s  (<=1.1m/s) AoV VTI:                 41.90 cm  (18-25cm) LVOT VTI:                25.70 cm AoV Area, VTI:           4.05 cm2  (2.5-5.5cm2) AoV Area,Vmax:           3.46 cm2  (2.5-4.5cm2) AoV Dimensionless Index: 0.61 AORTIC INSUFFICIENCY: AI Vmax:       2.62 m/s AI Half-time:  469 msec AI Decel Rate: 164.00 cm/s2 TRICUSPID VALVE/RVSP:                             Normal Ranges: Peak TR Velocity: 2.91 m/s RV Syst Pressure: 36.9 mmHg (< 30mmHg) PULMONIC VALVE:                      Normal Ranges: PV Max Saji: 0.5 m/s  (0.6-0.9m/s) PV Max P.2 mmHg  [id number] uRben Loyola MD Electronically signed on 3/7/2024 at 4:11:17 PM  ** Final **     Transthoracic Echo (TTE) Limited    Result Date: 2024   [facility], [address] [place] [address]                Tel [phone] and Fax [phone] TRANSTHORACIC ECHOCARDIOGRAM REPORT  Patient Name:      MIRIAM Dsouza  Physician:    [id number] Roberto Cano MD Study Date:        2/14/2024            Ordering Provider:    [id number] JUNE HERRERA MRN/PID:           [id number]             Fellow: Accession#:        [id number]         Nurse: Date of Birth/Age: 1950 / 73 years  Sonographer:          Ro Nix RDCS Gender:            F                    Additional Staff: Height:            157.48 cm            Admit Date:           2/13/2024 Weight:            62.14 kg             Admission Status:     Inpatient -                                                               Routine BSA / BMI:         1.63 m2 / 25.06      Encounter#:           [id number]                    kg/m2                                         Department Location[facility]  [facility]                                                               Invasive Blood Pressure: 119 /54 mmHg Study Type:    TRANSTHORACIC ECHO (TTE) LIMITED Diagnosis/ICD: Presence of prosthetic heart valve-Z95.2 Indication:    s/p TAVR CPT Code:      Echo Limited-92044; Color Doppler-38970; Doppler Limited-59156 Patient History: Pertinent History: HTN. s/p #29 Evolut FX TAVR (02/13/2024). Study Detail: The following Echo studies were performed: 2D, M-Mode, Doppler and               color flow. Technically challenging study due to prominent lung               artifact.  PHYSICIAN INTERPRETATION: Left Ventricle: The left ventricular systolic function is normal, with an estimated ejection fraction of 65%. There are no regional wall motion abnormalities. The left ventricular cavity size is normal. The left ventricular septal wall thickness is mildly increased. Left ventricular diastolic filling was indeterminate. Left Atrium: The left atrium is upper limits of normal in size. Right Ventricle: The right ventricle is normal in size. There is normal right ventricular global  systolic function. Right Atrium: The right atrium is normal in size. Aortic Valve: There is a prosthetic aortic valve present. There is transcatheter aortic valve replacement. There is mild tre-prosthetic aortic valve regurgitation. The peak aortic velocity was obtained from the suprasternal view. There is mild aortic valve regurgitation. The peak instantaneous gradient of the aortic valve is 21.5 mmHg. The mean gradient of the aortic valve is 9.0 mmHg. Mitral Valve: The mitral valve is mildly thickened. There is mild mitral annular calcification. There is trace mitral valve regurgitation. Tricuspid Valve: The tricuspid valve is structurally normal. There is mild tricuspid regurgitation. Pulmonic Valve: The pulmonic valve is not well visualized. There is trace pulmonic valve regurgitation. Pericardium: There is a trivial pericardial effusion. Aorta: The aortic root is normal. Pulmonary Artery: The tricuspid regurgitant velocity is 2.47 m/s, and with an estimated right atrial pressure of 3 mmHg, the estimated pulmonary artery pressure is normal with the RVSP at 27.4 mmHg. Systemic Veins: The inferior vena cava appears to be of normal size.  CONCLUSIONS:  1. Left ventricular systolic function is normal with a 65% estimated ejection fraction.  2. There is a transcatheter aortic valve replacement.  3. Mild tre-TAVR aortic valve regurgitation. QUANTITATIVE DATA SUMMARY: 2D MEASUREMENTS:                          Normal Ranges: LAs:           4.40 cm   (2.7-4.0cm) IVSd:          1.20 cm   (0.6-1.1cm) LVPWd:         0.60 cm   (0.6-1.1cm) LVIDd:         4.40 cm   (3.9-5.9cm) LVIDs:         2.60 cm LV Mass Index: 78.7 g/m2 LV % FS        40.9 % LA VOLUME:                               Normal Ranges: LA Vol A4C:        51.5 ml    (22+/-6mL/m2) LA Vol A2C:        56.0 ml LA Vol BP:         56.6 ml LA Vol Index A4C:  31.6ml/m2 LA Vol Index A2C:  34.4 ml/m2 LA Vol Index BP:   34.8 ml/m2 LA Area A4C:       17.1 cm2 LA Area A2C:        18.8 cm2 LA Major Axis A4C: 4.8 cm LA Major Axis A2C: 5.4 cm RA VOLUME BY A/L METHOD:                       Normal Ranges: RA Area A4C: 12.3 cm2 AORTA MEASUREMENTS:                      Normal Ranges: Ao Sinus, d: 3.30 cm (2.1-3.5cm) LV SYSTOLIC FUNCTION BY 2D PLANIMETRY (MOD):                     Normal Ranges: EF-A4C View: 62.8 % (>=55%) EF-A2C View: 65.5 % EF-Biplane:  62.7 % LV DIASTOLIC FUNCTION:                           Normal Ranges: MV Peak E:    0.92 m/s    (0.7-1.2 m/s) MV Peak A:    1.06 m/s    (0.42-0.7 m/s) E/A Ratio:    0.87        (1.0-2.2) MV e'         0.07 m/s    (>8.0) MV lateral e' 0.07 m/s MV medial e'  0.07 m/s MV A Dur:     114.00 msec E/e' Ratio:   13.59       (<8.0) a'            0.09 m/s MV DT:        280 msec    (150-240 msec) MITRAL VALVE:                 Normal Ranges: MV DT: 280 msec (150-240msec) AORTIC VALVE:                                    Normal Ranges: AoV Vmax:                2.32 m/s  (<=1.7m/s) AoV Peak P.5 mmHg (<20mmHg) AoV Mean P.0 mmHg  (1.7-11.5mmHg) LVOT Max Saji:            1.50 m/s  (<=1.1m/s) AoV VTI:                 46.60 cm  (18-25cm) LVOT VTI:                28.90 cm LVOT Diameter:           2.10 cm   (1.8-2.4cm) AoV Area, VTI:           2.15 cm2  (2.5-5.5cm2) AoV Area,Vmax:           2.24 cm2  (2.5-4.5cm2) AoV Dimensionless Index: 0.62  RIGHT VENTRICLE: RV Basal 3.00 cm RV Mid   1.90 cm RV Major 5.0 cm TAPSE:   23.4 mm RV s'    0.16 m/s TRICUSPID VALVE/RVSP:                             Normal Ranges: Peak TR Velocity: 2.47 m/s RV Syst Pressure: 27.4 mmHg (< 30mmHg) IVC Diam:         1.60 cm  [id number] Roberto Cano MD Electronically signed on 2024 at 10:12:52 AM  ** Final **     Transthoracic Echo (TTE) Limited    Result Date: 2024   [facility], [address] [place] [address]                Tel [phone] and Fax [phone] TRANSTHORACIC ECHOCARDIOGRAM REPORT  Patient Name:      MIRIAM  NORM Dsouza Physician:    [id number] Deborah Petersen MD Study Date:        2/13/2024            Ordering Provider:    [id number] SHANELL RODRIGESRICHA MRN/PID:           [id number]             Fellow: Accession#:        [id number]         Nurse: Date of Birth/Age: 1950 / 73 years  Sonographer:          Amy SALGADO Gender:            F                    Additional Staff:     NAOMI Waldron RDCS Height:            157.00 cm            Admit Date:           2/13/2024 Weight:            61.69 kg             Admission Status:     Inpatient -                                                               Routine BSA / BMI:         1.62 m2 / 25.03      Encounter#:           [id number]                    kg/m2                                         Department Location:  [facility]                                                               Cath Lab Blood Pressure: 130 /78 mmHg Study Type:    TRANSTHORACIC ECHO (TTE) LIMITED Diagnosis/ICD: Nonrheumatic aortic (valve) stenosis-I35.0 Indication:    Periprocedure TAVR CPT Code:      Echo Limited-15334; Color Doppler-47518; Doppler Limited-15228 Patient History: Valve Disorders:   Aortic Stenosis. Pertinent History: Chest Pain and HTN. Dizziness. Study Detail: The following Echo studies were performed: 2D, M-Mode, Doppler and               color flow. Technically challenging study due to prominent lung               artifact and patient lying in supine position.  PHYSICIAN INTERPRETATION: Left Ventricle: The left ventricular systolic function is normal, with an estimated ejection fraction of 60-65%. There are no regional wall motion abnormalities. The left ventricular cavity size is normal. Left ventricular diastolic filling was not assessed. Left Atrium: The left atrium is  moderately dilated. Right Ventricle: The right ventricle is normal in size. There is normal right ventricular global systolic function. Right Atrium: The right atrium is normal in size. Aortic Valve: The aortic valve appears structurally normal. There is moderate to severe aortic valve cusp calcification. There is evidence of moderate to severe aortic valve stenosis. There is mild aortic valve regurgitation. The peak instantaneous gradient of the aortic valve is 52.4 mmHg. The mean gradient of the aortic valve is 30.0 mmHg. Mitral Valve: The mitral valve is normal in structure. There is mild mitral annular calcification. There is trace mitral valve regurgitation. Tricuspid Valve: The tricuspid valve is structurally normal. There is trace tricuspid regurgitation. Pulmonic Valve: The pulmonic valve is not well visualized. Pulmonic valve regurgitation was not assessed. Pericardium: There is no pericardial effusion noted. Aorta: The aortic root is normal. The Ao Sinus is 3.30 cm. In comparison to the previous echocardiogram(s): There are no prior studies on this patient for comparison purposes.  Post Transcatheter Aortic Valve Placement (TAVR): The peak instantaneous gradient of the aortic valve is 7.5 mmHg. The mean gradient of the aortic valve is 3.0 mmHg. There is a [employer]tr[employer] transcatheter aortic valve replacement, with a 29 mm reported size. There is no pericardial effusion noted. Normal gradinets and DI.  CONCLUSIONS:  1. Left ventricular systolic function is normal with a 60-65% estimated ejection fraction.  2. The left atrium is moderately dilated.  3. Moderate to severe aortic valve stenosis.  4. There is moderate to severe aortic valve cusp calcification.  5. Mild aortic valve regurgitation.  6. Post TAVR - normal gradinets and DI. QUANTITATIVE DATA SUMMARY: 2D MEASUREMENTS:                          Normal Ranges: Ao Root d:     3.30 cm   (2.0-3.7cm) IVSd:          1.20 cm   (0.6-1.1cm) LVPWd:         1.00 cm    (0.6-1.1cm) LVIDd:         3.80 cm   (3.9-5.9cm) LVIDs:         2.60 cm LV Mass Index: 83.2 g/m2 LV % FS        31.6 % LA VOLUME:                               Normal Ranges: LA Vol A4C:        69.6 ml    (22+/-6mL/m2) LA Vol A2C:        69.4 ml LA Vol BP:         72.5 ml LA Vol Index A4C:  43.0ml/m2 LA Vol Index A2C:  42.9 ml/m2 LA Vol Index BP:   44.8 ml/m2 LA Area A4C:       21.6 cm2 LA Area A2C:       22.5 cm2 LA Major Axis A4C: 5.7 cm LA Major Axis A2C: 6.2 cm LA Volume Index:   44.8 ml/m2 LA Vol A4C:        62.4 ml LA Vol A2C:        66.2 ml AORTA MEASUREMENTS:                      Normal Ranges: Ao Sinus, d: 3.30 cm (2.1-3.5cm) LV SYSTOLIC FUNCTION BY 2D PLANIMETRY (MOD):                     Normal Ranges: EF-A4C View: 62.1 % (>=55%) AORTIC VALVE:                                              Normal Ranges: AoV Vmax:                          3.62 m/s  (<=1.7m/s) AoV Vmax Post TAVR:                1.37 m/s  (<=1.7m/s) AoV Peak P.4 mmHg (<20mmHg) AoV Peak PG Post TAVR:             7.5 mmHg  (<20mmHg) AoV Mean P.0 mmHg (1.7-11.5mmHg) AoV Mean PG Post TAVR:             3.0 mmHg  (1.7-11.5mmHg) LVOT Max Saji:                      1.09 m/s  (<=1.1m/s) LVOT Max Saji Post TAVR:            1.09 m/s  (<=1.1m/s) AoV VTI:                           97.40 cm  (18-25cm) AoV VTI Post TAVR:                 26.10 cm  (18-25cm) LVOT VTI:                          26.90 cm LVOT VTI Post TAVR:                24.20 cm LVOT Diameter:                     2.00 cm   (1.8-2.4cm) LVOT Diameter Post TAVR:           2.00 cm   (1.8-2.4cm) AoV Area, VTI:                     0.87 cm2  (2.5-5.5cm2) AoV Area, VTI Post TAVR:           2.91 cm2  (2.5-5.5cm2) AoV Area,Vmax:                     0.95 cm2  (2.5-4.5cm2) AoV Area,Vmax Post TAVR:           2.50 cm2  (2.5-4.5cm2) AoV Dimensionless Index:           0.28 AoV Dimensionless Index Post TAVR: 0.93 AORTIC INSUFFICIENCY: AI Vmax:       4.74 m/s  AI Half-time:  455 msec AI Decel Rate: 305.00 cm/s2  [id number] Deborah Petersen MD Electronically signed on 2/13/2024 at 3:48:52 PM  ** Final **                  Heart Failure Follow up    NYHA class 1-2    Edema Worse   Dyspnea on Exertion Denies  Fatigue Improved  Exercise Intolerance Improved  Orthopnea Denies  PND Denies    Chest pain No  Syncope No  Palpitations No    All organ systems normal except: edema, fatigue                 KCCQ Questionnaire    1  Heart failure affects different people in different ways. Some feel shortness of breath while others feel fatigue. Please indicate how much you are limited by heart failure (shortness of breath or fatigue) in your ability to do the following activities over the past 2 weeks.     A.) Showering/bathing  5. Not at All  B.) Walking 1 block on level ground 5. Not at All  C.) Hurrying or Jogging   6. Limited for other reastons    2.  Over the past 2 weeks, how many times did you have swelling in your feet, ankles or legs when you woke up in the morning? 1. Every morning    3.  Over the past 2 weeks, on average, how many times has fatigue limited your ability to do what you wanted? 4. 3 or more times a week but not every day    4.  Over the past 2 weeks, on average, how many times has shortness of breath limited your ability to do what you wanted? 7. Never    5.  Over the past 2 weeks, on average, how many times have you been forced to sleep sitting up in a chair or with at least 3 pillows to prop you up because of shortness of breath? Never    6. Over the past 2 weeks, how much has your heart failure limited your enjoyment of life? It has slightly limited my enjoyment of life    7. If you had to spend the rest of your life with your heart failure the way it is right now, how would you feel about this? 4. Mostly satisfied    8. How much does your heart failure affect your lifestyle? Please indicate how your heart failure may have limited yourparticipation in the following  "activities over the past 2 weeks    A.)  Hobbies, recreational activities  4. Slightly limited    B.) Working or doing household chores  4. Slightly limited    C.) Visiting family or friends out of your home  4. Slightly limited      Neha Stroud is a 72yo F with a PMH of CAD with PCI to RCA (12/23), hypertension, dyslipidemia, CKD and anemia. S/p B/L femoral artery (Right primary) Evolut FX 29mm predil with 20mm Trudil balloon via right Femoral Artery on 2/13/24.  Procedure was successful and discharged on POD 1.  Pt was admitted on 2/23/24 d/t groin wound dehiscence (discharged 2/24/24)    Impression  - 1 month s/p TAVR  - Pt appears to be euvolemic with flat neck veins and no BLE edema.  Work of breathing normal with NAD, skin tone without pallor.   - HF symptoms:  denies SOB/GARCIA, improving fatigue, worsening edema  - vitals:  stable, -144/77, wt 131-134lbs  - groins:  Right healing well, Left with small \"skin tag\", likely a glue granuloma  - Overall appears well, no acute issues, improving from a procedure standpoint.  Would benefit from increasing her activity level.    1 month ECHO - EF 65-70%, triv AI, grad 17.3/8.0, trace MR, mild TR, no PC effusion      Plan:   - Left groin glue granuloma will need to be removed (can be by patient or by health care professional).  Not a surgical procedure - clean with topical solution and gently pull until granuloma detaches.  Will arrange for pt to see Provider at [place] if unable to remove.  - Cont ASA for life  - Cont current medication regimen  - Cont to increase activity  - f/u with Structural NP in 1 year - ECHO can be closer to home  - f/u with Dr. Pimentel (Primary Cards) as scheduled or in 6-10 weeks  - Life-long Dental SBE prophylaxis needed - Amoxicillin ordered    Virtual Virtual    Ramone Segundo MSN, [facility]-BC  Acute Care Nurse Practitioner  Structural Heart / TAVR Team  [phone] (ph)  [phone] (fax)    "

## 2024-04-15 ENCOUNTER — TELEPHONE (OUTPATIENT)
Dept: CARDIOLOGY | Facility: CLINIC | Age: 74
End: 2024-04-15
Payer: MEDICARE

## 2024-04-16 ENCOUNTER — OFFICE VISIT (OUTPATIENT)
Dept: CARDIOLOGY | Facility: CLINIC | Age: 74
End: 2024-04-16
Payer: MEDICARE

## 2024-04-16 VITALS
WEIGHT: 137 LBS | HEART RATE: 68 BPM | SYSTOLIC BLOOD PRESSURE: 138 MMHG | HEIGHT: 63 IN | BODY MASS INDEX: 24.27 KG/M2 | DIASTOLIC BLOOD PRESSURE: 82 MMHG

## 2024-04-16 DIAGNOSIS — E78.2 MIXED HYPERLIPIDEMIA: ICD-10-CM

## 2024-04-16 DIAGNOSIS — I25.10 CORONARY ARTERY DISEASE INVOLVING NATIVE CORONARY ARTERY OF NATIVE HEART WITHOUT ANGINA PECTORIS: ICD-10-CM

## 2024-04-16 DIAGNOSIS — Z95.2 S/P TAVR (TRANSCATHETER AORTIC VALVE REPLACEMENT): Primary | ICD-10-CM

## 2024-04-16 DIAGNOSIS — I10 ESSENTIAL HYPERTENSION: ICD-10-CM

## 2024-04-16 PROBLEM — E78.5 HYPERLIPIDEMIA: Status: ACTIVE | Noted: 2024-04-16

## 2024-04-16 PROCEDURE — 3079F DIAST BP 80-89 MM HG: CPT | Performed by: NURSE PRACTITIONER

## 2024-04-16 PROCEDURE — 99213 OFFICE O/P EST LOW 20 MIN: CPT | Performed by: NURSE PRACTITIONER

## 2024-04-16 PROCEDURE — 1036F TOBACCO NON-USER: CPT | Performed by: NURSE PRACTITIONER

## 2024-04-16 PROCEDURE — 1159F MED LIST DOCD IN RCRD: CPT | Performed by: NURSE PRACTITIONER

## 2024-04-16 PROCEDURE — 1160F RVW MEDS BY RX/DR IN RCRD: CPT | Performed by: NURSE PRACTITIONER

## 2024-04-16 PROCEDURE — 3075F SYST BP GE 130 - 139MM HG: CPT | Performed by: NURSE PRACTITIONER

## 2024-04-16 ASSESSMENT — ENCOUNTER SYMPTOMS
NEAR-SYNCOPE: 0
PALPITATIONS: 0
IRREGULAR HEARTBEAT: 0
ORTHOPNEA: 0
DYSPNEA ON EXERTION: 0
SYNCOPE: 0
PND: 0

## 2024-04-16 NOTE — LETTER
"April 16, 2024     Loree Salmon, APRN-CNP  [address]  [facility] [address]    Patient: Neha Stroud   YOB: 1950   Date of Visit: 4/16/2024       Dear Dr. Loree Salmon, APRN-CNP:    Thank you for referring Neha Stroud to me for evaluation. Below are my notes for this consultation.  If you have questions, please do not hesitate to call me. I look forward to following your patient along with you.       Sincerely,     Marline Aleman, APRN-CNP      CC: No Recipients  ______________________________________________________________________________________    Chief Complaint  \" Doing just fine\"    Reason for Visit  2-month follow-up  Patient presents to the office today for outpatient follow-up for coronary artery disease, secondary prevention and aortic valve replacement  Last evaluated in clinic by Dr. Pimentel February 2024.  At that time she had right femoral dehiscence.  She then had a 3-day admit due to\" wound opening up\" and a telemetry follow-up with cardiothoracic surgery last month was noted to have a residual glue granuloma, today she reports that it has resolved and\" everything has healed up\".    History of Present Illness  Patient is an extremely pleasant 74-year-old female who presents to the office ambulatory in no voiced cardiovascular complaints.  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  She actually reports improvement in overall exercise capacity and functional tolerance since her surgical procedure.  She feels better ambulated in from the parking lot today than she did approximately 2 months ago with Dr. Pimentel.    Remains compliant with DAPT.  Reviewed the importance of dental SBE prophylaxis, no dental procedures during 3 months post AVR timeframe.     Patient reports that overall has no complaint(s) of chest pain, chest pressure/discomfort, " "claudication, dyspnea, exertional chest pressure/discomfort, fatigue, and irregular heart beat    Daily activity:    ADLs, stairs on a regular basis, housework  Reports improvement in exercise capacity or functional tolerance since last office visit.    The importance of secondary prevention reviewed:  HTN: Optimal in office  HLD: Treated, annual labs through PCP  DM: Denies  Smoker: Denies  BMI:  Reviewed the merits of healthy lifestyle choices on overall cardiovascular health.    Overall patient is pleased with current state of cardiovascular health.  At this time there are no indications for additional cardiovascular testing or need for medication changes.     Review of Systems   Cardiovascular:  Negative for chest pain, dyspnea on exertion, irregular heartbeat, leg swelling, near-syncope, orthopnea, palpitations, paroxysmal nocturnal dyspnea and syncope.        Visit Vitals  /82 (BP Location: Left arm, Patient Position: Sitting)   Pulse 68   Ht 1.588 m (5' 2.5\")   Wt 62.1 kg (137 lb)   LMP  (LMP Unknown)   BMI 24.66 kg/m²   OB Status Postmenopausal   Smoking Status Never   BSA 1.66 m²     Physical Exam  Vitals and nursing note reviewed.   HENT:      Head: Normocephalic.   Cardiovascular:      Rate and Rhythm: Normal rate and regular rhythm.      Heart sounds: Murmur heard.      Systolic murmur is present with a grade of 1/6.   Pulmonary:      Effort: Pulmonary effort is normal.      Breath sounds: Normal breath sounds.   Abdominal:      Palpations: Abdomen is soft.   Musculoskeletal:      Right lower leg: No edema.      Left lower leg: No edema.   Skin:     General: Skin is warm and dry.   Neurological:      General: No focal deficit present.      Mental Status: She is alert.   Psychiatric:         Mood and Affect: Mood normal.         Behavior: Behavior normal.     No Known Allergies    Current Outpatient Medications   Medication Instructions   • acetaminophen (Tylenol) 500 mg tablet 1-2 tablets, oral, " Every 6 hours PRN   • amoxicillin (Amoxil) 500 mg capsule Take 4 caps (2000 mg) 30-60mins prior to your dental appointment   • aspirin 81 mg, oral, Daily   • atorvastatin (LIPITOR) 40 mg, oral, Daily   • Brilinta 90 mg, oral, 2 times daily   • dicyclomine (BENTYL) 20 mg, oral, 2 times daily PRN   • ferrous sulfate 325 (65 Fe) MG EC tablet 2 tablets, oral, Daily, Do not crush, chew, or split.   • furosemide (LASIX) 20 mg, oral, As needed   • levothyroxine (SYNTHROID, LEVOXYL) 100 mcg, oral, Daily before breakfast   • lisinopriL-hydrochlorothiazide 20-12.5 mg tablet 1 tablet, oral, Daily   • metoprolol succinate XL (TOPROL-XL) 25 mg, oral, Daily, Do not crush or chew.   • rizatriptan (MAXALT) 10 mg, oral, Daily PRN, May repeat in 2 hours if needed      Assessment:    A 74-year-old female with history of December 2023 RCA PCI (no ACS admit), February 2024 TAVR with subsequent right femoral site dehiscence and recent echocardiogram showing normal LVEF with appropriately functioning bioprosthetic valve resents to the office for routine follow-up.  Secondary prevention is optimal.  She remains stable from a cardiovascular standpoint with daily activity greater than 4 METS.    Discussed the dynamic nature of coronary artery disease and the importance of seeking medical attention if new symptoms arise.    Problem List Items Addressed This Visit       Essential hypertension    S/P TAVR (transcatheter aortic valve replacement) - Primary    Coronary artery disease involving native coronary artery of native heart without angina pectoris    Hyperlipidemia    BMI 24.0-24.9, adult      Plan:     Through informed decision making process incorporating patients unique circumstances, the following treatment plan will be initiated:    1.  Prescription drug management of cardiovascular medication for efficacy, adherence to treatment, side effect assessment and polypharmacy. Current treatment clinically warranted and to continue without  modifications.    2. Return for follow-up; in the interim, contact the office if new symptoms arise.  Dr. Pimentel as scheduled     Marline Aleman  MSN, APRN-CNP, PMHNP-BC  [facility]    Please excuse any errors in grammar or translation related to this dictation. Voice recognition software was utilized to prepare this document.

## 2024-04-16 NOTE — PATIENT INSTRUCTIONS
Please bring all medicines, vitamins, and herbal supplements with you when you come to the office.    Prescriptions will not be filled unless you are compliant with your follow up appointments or have a follow up appointment scheduled as per instruction of your physician. Refills should be requested at the time of your visit.     PLAN:   Through informed decision making process incorporating patients unique circumstances, the following treatment plan will be initiated:    1.  Prescription drug management of cardiovascular medication for efficacy, adherence to treatment, side effect assessment and polypharmacy. Current treatment clinically warranted and to continue without modifications.    2. Return for follow-up; in the interim, contact the office if new symptoms arise.  Dr. Pimentel as scheduled

## 2024-04-16 NOTE — PROGRESS NOTES
"Chief Complaint  \" Doing just fine\"    Reason for Visit  2-month follow-up  Patient presents to the office today for outpatient follow-up for coronary artery disease, secondary prevention and aortic valve replacement  Last evaluated in clinic by Dr. Pimentel February 2024.  At that time she had right femoral dehiscence.  She then had a 3-day admit due to\" wound opening up\" and a telemetry follow-up with cardiothoracic surgery last month was noted to have a residual glue granuloma, today she reports that it has resolved and\" everything has healed up\".    History of Present Illness  Patient is an extremely pleasant 74-year-old female who presents to the office ambulatory in no voiced cardiovascular complaints.  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  She actually reports improvement in overall exercise capacity and functional tolerance since her surgical procedure.  She feels better ambulated in from the parking lot today than she did approximately 2 months ago with Dr. Pimentel.    Remains compliant with DAPT.  Reviewed the importance of dental SBE prophylaxis, no dental procedures during 3 months post AVR timeframe.     Patient reports that overall has no complaint(s) of chest pain, chest pressure/discomfort, claudication, dyspnea, exertional chest pressure/discomfort, fatigue, and irregular heart beat    Daily activity:    ADLs, stairs on a regular basis, housework  Reports improvement in exercise capacity or functional tolerance since last office visit.    The importance of secondary prevention reviewed:  HTN: Optimal in office  HLD: Treated, annual labs through PCP  DM: Denies  Smoker: Denies  BMI:  Reviewed the merits of healthy lifestyle choices on overall cardiovascular health.    Overall patient is pleased with current state of cardiovascular health.  At this time there are no indications " "for additional cardiovascular testing or need for medication changes.     Review of Systems   Cardiovascular:  Negative for chest pain, dyspnea on exertion, irregular heartbeat, leg swelling, near-syncope, orthopnea, palpitations, paroxysmal nocturnal dyspnea and syncope.        Visit Vitals  /82 (BP Location: Left arm, Patient Position: Sitting)   Pulse 68   Ht 1.588 m (5' 2.5\")   Wt 62.1 kg (137 lb)   LMP  (LMP Unknown)   BMI 24.66 kg/m²   OB Status Postmenopausal   Smoking Status Never   BSA 1.66 m²     Physical Exam  Vitals and nursing note reviewed.   HENT:      Head: Normocephalic.   Cardiovascular:      Rate and Rhythm: Normal rate and regular rhythm.      Heart sounds: Murmur heard.      Systolic murmur is present with a grade of 1/6.   Pulmonary:      Effort: Pulmonary effort is normal.      Breath sounds: Normal breath sounds.   Abdominal:      Palpations: Abdomen is soft.   Musculoskeletal:      Right lower leg: No edema.      Left lower leg: No edema.   Skin:     General: Skin is warm and dry.   Neurological:      General: No focal deficit present.      Mental Status: She is alert.   Psychiatric:         Mood and Affect: Mood normal.         Behavior: Behavior normal.     No Known Allergies    Current Outpatient Medications   Medication Instructions    acetaminophen (Tylenol) 500 mg tablet 1-2 tablets, oral, Every 6 hours PRN    amoxicillin (Amoxil) 500 mg capsule Take 4 caps (2000 mg) 30-60mins prior to your dental appointment    aspirin 81 mg, oral, Daily    atorvastatin (LIPITOR) 40 mg, oral, Daily    Brilinta 90 mg, oral, 2 times daily    dicyclomine (BENTYL) 20 mg, oral, 2 times daily PRN    ferrous sulfate 325 (65 Fe) MG EC tablet 2 tablets, oral, Daily, Do not crush, chew, or split.    furosemide (LASIX) 20 mg, oral, As needed    levothyroxine (SYNTHROID, LEVOXYL) 100 mcg, oral, Daily before breakfast    lisinopriL-hydrochlorothiazide 20-12.5 mg tablet 1 tablet, oral, Daily    metoprolol " succinate XL (TOPROL-XL) 25 mg, oral, Daily, Do not crush or chew.    rizatriptan (MAXALT) 10 mg, oral, Daily PRN, May repeat in 2 hours if needed      Assessment:    A 74-year-old female with history of December 2023 RCA PCI (no ACS admit), February 2024 TAVR with subsequent right femoral site dehiscence and recent echocardiogram showing normal LVEF with appropriately functioning bioprosthetic valve resents to the office for routine follow-up.  Secondary prevention is optimal.  She remains stable from a cardiovascular standpoint with daily activity greater than 4 METS.    Discussed the dynamic nature of coronary artery disease and the importance of seeking medical attention if new symptoms arise.    Problem List Items Addressed This Visit       Essential hypertension    S/P TAVR (transcatheter aortic valve replacement) - Primary    Coronary artery disease involving native coronary artery of native heart without angina pectoris    Hyperlipidemia    BMI 24.0-24.9, adult      Plan:     Through informed decision making process incorporating patients unique circumstances, the following treatment plan will be initiated:    1.  Prescription drug management of cardiovascular medication for efficacy, adherence to treatment, side effect assessment and polypharmacy. Current treatment clinically warranted and to continue without modifications.    2. Return for follow-up; in the interim, contact the office if new symptoms arise.  Dr. Pimentel as scheduled     Marline Aleman  MSN, APRN-CNP, PMHNP-BC  [facility]    Please excuse any errors in grammar or translation related to this dictation. Voice recognition software was utilized to prepare this document.

## 2024-05-01 DIAGNOSIS — Z95.2 S/P TAVR (TRANSCATHETER AORTIC VALVE REPLACEMENT): ICD-10-CM

## 2024-05-01 DIAGNOSIS — I51.89 DIASTOLIC DYSFUNCTION: ICD-10-CM

## 2024-05-02 RX ORDER — FUROSEMIDE 20 MG/1
TABLET ORAL
Qty: 30 TABLET | Refills: 1 | Status: SHIPPED | OUTPATIENT
Start: 2024-05-02

## 2024-05-13 NOTE — PROGRESS NOTES
Called and spoke to patient on the phone.  Patient reports hypotension with a past week or so.  With systolic blood pressures in the 80s and 90s.  Patient states she is feeling weak and tired.  She also reports gaining 4 pounds overnight.      I suspect the patient will diurese better with higher blood pressure therefore we will decrease one of her home antihypertensives.    Plan:  Holding lisinopril hydrochlorothiazide, for now  Continue metoprolol  Will talk to the patient in 1 week to discuss pressures and volume status        Ramone Segundo MSN, [facility]-BC  Acute Care Nurse Practitioner  Structural Heart / TAVR Team  [phone] (ph)  [phone] (fax)

## 2024-08-21 ENCOUNTER — APPOINTMENT (OUTPATIENT)
Dept: CARDIOLOGY | Facility: CLINIC | Age: 74
End: 2024-08-21
Payer: MEDICARE

## 2024-08-21 VITALS
HEIGHT: 63 IN | SYSTOLIC BLOOD PRESSURE: 122 MMHG | DIASTOLIC BLOOD PRESSURE: 50 MMHG | BODY MASS INDEX: 26.05 KG/M2 | HEART RATE: 58 BPM | WEIGHT: 147 LBS

## 2024-08-21 DIAGNOSIS — I35.0 NONRHEUMATIC AORTIC VALVE STENOSIS: ICD-10-CM

## 2024-08-21 DIAGNOSIS — Z95.2 S/P TAVR (TRANSCATHETER AORTIC VALVE REPLACEMENT): ICD-10-CM

## 2024-08-21 DIAGNOSIS — I10 ESSENTIAL HYPERTENSION: ICD-10-CM

## 2024-08-21 DIAGNOSIS — E66.3 OVERWEIGHT (BMI 25.0-29.9): ICD-10-CM

## 2024-08-21 DIAGNOSIS — E78.2 MIXED HYPERLIPIDEMIA: ICD-10-CM

## 2024-08-21 DIAGNOSIS — Z98.61 S/P PTCA (PERCUTANEOUS TRANSLUMINAL CORONARY ANGIOPLASTY): ICD-10-CM

## 2024-08-21 DIAGNOSIS — I25.10 CORONARY ARTERY DISEASE INVOLVING NATIVE CORONARY ARTERY OF NATIVE HEART WITHOUT ANGINA PECTORIS: ICD-10-CM

## 2024-08-21 PROCEDURE — 3078F DIAST BP <80 MM HG: CPT | Performed by: INTERNAL MEDICINE

## 2024-08-21 PROCEDURE — 99214 OFFICE O/P EST MOD 30 MIN: CPT | Performed by: INTERNAL MEDICINE

## 2024-08-21 PROCEDURE — 1160F RVW MEDS BY RX/DR IN RCRD: CPT | Performed by: INTERNAL MEDICINE

## 2024-08-21 PROCEDURE — 3074F SYST BP LT 130 MM HG: CPT | Performed by: INTERNAL MEDICINE

## 2024-08-21 PROCEDURE — 1036F TOBACCO NON-USER: CPT | Performed by: INTERNAL MEDICINE

## 2024-08-21 PROCEDURE — 1159F MED LIST DOCD IN RCRD: CPT | Performed by: INTERNAL MEDICINE

## 2024-08-21 NOTE — PATIENT INSTRUCTIONS
Please bring all medicines, vitamins, and herbal supplements with you when you come to the office.    Prescriptions will not be filled unless you are compliant with your follow up appointments or have a follow up appointment scheduled as per instruction of your physician. Refills should be requested at the time of your visit.     BMI was above normal measurement. Current weight: 66.7 kg (147 lb)  Weight change since last visit (-) denotes wt loss 10 lbs   Weight loss needed to achieve BMI 25: 8.4 Lbs  Weight loss needed to achieve BMI 30: -19.3 Lbs  Provided instructions on dietary changes  Provided instructions on exercise.    Stop brilinta 12/2024    BMI was above normal measurement. Current weight: 66.7 kg (147 lb)  Weight change since last visit (-) denotes wt loss 10 lbs   Weight loss needed to achieve BMI 25: 8.4 Lbs  Weight loss needed to achieve BMI 30: -19.3 Lbs  Provided instructions on dietary changes.

## 2024-08-21 NOTE — PROGRESS NOTES
"Subjective   Neha Stroud is a 74 y.o. female       Chief Complaint    Follow-up          74-year-old active female returns for follow-up and is doing very well.  She denies any angina, nitrate usage, hospitalizations or recurrent cardiac events.  In December 2023 she underwent ACS/syncope was discovered to have severe single-vessel RCA disease underwent revascularization of the proximal-mid RCA with 3.5 mm Marcelino stent.  She had expedited TAVR thereafterwards:    with a PMH of CAD, AS, HTN, hypothyroidism, osteoporosis who underwent RCA PCI in 12/2023 and then had an episode of syncope resulting in TAVR w/u being expedited. She had an Evolut FX 29mm predil with 20mm Trudil balloon via right Femoral Artery on  2/13/24. She was discharged on 2/14/2024    She is otherwise doing well, she has gone through 35 sessions of cardiac rehab    Her medical history is noted for hypertension, the above-mentioned ASHD (single-vessel), and aortic stenosis all with revascularization and TAVR details of the results have been reviewed she remains on dual antiplatelet therapy and moderate dose statin    Recommendations, obtain lipid panel, discontinue Brilinta on the 12-month anniversary in December, obtain echocardiogram February 2025, 12-month anniversary from her TAVR, and I will follow-up again in 8 months.         Review of Systems   All other systems reviewed and are negative.           Vitals:    08/21/24 1015   BP: 122/50   BP Location: Left arm   Patient Position: Sitting   Pulse: 58   Weight: 66.7 kg (147 lb)   Height: 1.588 m (5' 2.5\")        Objective   Physical Exam  Constitutional:       Appearance: Normal appearance.   HENT:      Nose: Nose normal.   Neck:      Vascular: No carotid bruit.   Cardiovascular:      Rate and Rhythm: Normal rate.      Pulses: Normal pulses.      Heart sounds: Normal heart sounds.   Pulmonary:      Effort: Pulmonary effort is normal.   Abdominal:      General: Bowel sounds are normal.      " Palpations: Abdomen is soft.   Musculoskeletal:         General: Normal range of motion.      Cervical back: Normal range of motion.      Right lower leg: No edema.      Left lower leg: No edema.   Skin:     General: Skin is warm and dry.   Neurological:      General: No focal deficit present.      Mental Status: She is alert.   Psychiatric:         Mood and Affect: Mood normal.         Behavior: Behavior normal.         Thought Content: Thought content normal.         Judgment: Judgment normal.         Allergies  Patient has no known allergies.     Current Medications    Current Outpatient Medications:     acetaminophen (Tylenol) 500 mg tablet, Take 1-2 tablets (500-1,000 mg) by mouth every 6 hours if needed for mild pain (1 - 3)., Disp: , Rfl:     amoxicillin (Amoxil) 500 mg capsule, Take 4 caps (2000 mg) 30-60mins prior to your dental appointment, Disp: 4 capsule, Rfl: 5    aspirin 81 mg EC tablet, Take 1 tablet (81 mg) by mouth once daily., Disp: , Rfl:     atorvastatin (Lipitor) 40 mg tablet, Take 1 tablet (40 mg) by mouth once daily., Disp: 90 tablet, Rfl: 3    Brilinta 90 mg tablet, Take 1 tablet (90 mg) by mouth 2 times a day., Disp: , Rfl:     furosemide (Lasix) 20 mg tablet, take 1 tablet by mouth if needed for INCREASED WEIGHT GAIN (3 LBS IN 24 HOURS, 5 LBS IN 48 HOURS), INCREASED LEG SWELLING, AND/OR INCREASED SHORTNESS OF BREATH., Disp: 30 tablet, Rfl: 1    levothyroxine (Synthroid, Levoxyl) 100 mcg tablet, Take 1 tablet (100 mcg) by mouth once daily in the morning. Take before meals., Disp: , Rfl:     lisinopriL-hydrochlorothiazide 20-12.5 mg tablet, Take 1 tablet by mouth once daily., Disp: , Rfl:     metoprolol succinate XL (Toprol-XL) 25 mg 24 hr tablet, Take 1 tablet (25 mg) by mouth once daily. Do not crush or chew., Disp: 30 tablet, Rfl: 11    rizatriptan (Maxalt) 10 mg tablet, Take 1 tablet (10 mg) by mouth once daily as needed for migraine. May repeat in 2 hours if needed, Disp: , Rfl:                       Assessment/Plan   1. Coronary artery disease involving native coronary artery of native heart without angina pectoris  Follow Up In Cardiology      2. S/P PTCA (percutaneous transluminal coronary angioplasty)        3. Nonrheumatic aortic valve stenosis        4. S/P TAVR (transcatheter aortic valve replacement)        5. Essential hypertension        6. Mixed hyperlipidemia        7. Overweight (BMI 25.0-29.9)                 Scribe Attestation  By signing my name below, I, Heaven PATEL LPN  , Scribe   attest that this documentation has been prepared under the direction and in the presence of Ruben Pimentel DO.     Provider Attestation - Scribe documentation    All medical record entries made by the Scribe were at my direction and personally dictated by me. I have reviewed the chart and agree that the record accurately reflects my personal performance of the history, physical exam, discussion and plan.

## 2024-08-29 NOTE — Clinical Note
dry, intact, no bleeding and no hematoma. LFA [0] : 0 [de-identified] : DOS: 3/27/24 Left L4-5 Discectomy;   8/27/24: Follow up L Spine. Persisting soreness in left sided of low back and soreness into L thigh. MRI review.   8/12/24: Follow up L Spine. ~8 months s/p left L4-5 discectomy. Persistent with left lower back soreness. Pain when getting up from sitting position. Seeing Dr. Infante tomorrow.   6/19/24: PO #3. Doing a bit better since last visit. Remains with some stiffness when sitting or lying down for prolonged periods of time. Resuming PT in the next couple of days.   5/7/24: about 6 weeks s/p discectomy 3/27/24 and laminectomy 3/29/24 @ [facility] for epidural hematoma. Preop leg symptoms are gone. stiffness and soreness in the back.  4/9/24: PO#1- L Spine. 2 weeks s/p discectomy. Doing well since sx. He was rushed back to ER 2 days after due to hematoma at sx site. Had 2nd surgery for a washup on 3/29. Feels soreness but doing much better. No pain. Ambulating w/o a cane.   02/17/24: Patient is a 65 year old male here for his lower back. States after having LT hip sx October 2nd, 2023 with Dr. Infante. States 2-3 weeks later he developed pain in the lower back had an MRI completed. Pain radiates down left leg to shin. Standing for prolonged hours worsens the pain, sitting is okay. Had 2 inj's L4-5, L5-S1 TFESI on 01/04/24 and 02/01/24 w/o any relief. Concerned for his future presentations since symptoms haven't improved since onset.  [] : no [[email or web address]] : MRI REVIEW L SPINE

## 2024-12-08 DIAGNOSIS — I10 ESSENTIAL HYPERTENSION: ICD-10-CM

## 2024-12-10 RX ORDER — METOPROLOL SUCCINATE 25 MG/1
25 TABLET, EXTENDED RELEASE ORAL DAILY
Qty: 90 TABLET | Refills: 3 | Status: SHIPPED | OUTPATIENT
Start: 2024-12-10 | End: 2025-12-10

## 2025-02-04 ENCOUNTER — APPOINTMENT (OUTPATIENT)
Dept: CARDIOLOGY | Facility: CLINIC | Age: 75
End: 2025-02-04
Payer: MEDICARE

## 2025-02-14 ENCOUNTER — TELEMEDICINE (OUTPATIENT)
Dept: CARDIOLOGY | Facility: HOSPITAL | Age: 75
End: 2025-02-14
Payer: MEDICARE

## 2025-02-14 DIAGNOSIS — Z95.2 S/P TAVR (TRANSCATHETER AORTIC VALVE REPLACEMENT): Primary | ICD-10-CM

## 2025-02-14 PROCEDURE — 1160F RVW MEDS BY RX/DR IN RCRD: CPT | Performed by: NURSE PRACTITIONER

## 2025-02-14 PROCEDURE — 99213 OFFICE O/P EST LOW 20 MIN: CPT | Performed by: NURSE PRACTITIONER

## 2025-02-14 PROCEDURE — 1159F MED LIST DOCD IN RCRD: CPT | Performed by: NURSE PRACTITIONER

## 2025-02-14 PROCEDURE — 1036F TOBACCO NON-USER: CPT | Performed by: NURSE PRACTITIONER

## 2025-02-14 NOTE — PROGRESS NOTES
Structural Heart Follow up visit    Neha Stroud is a 74 y.o. female presents today for 1 year follow up s/p TAVR     denies SOB,GARCIA, fatigue  Recent Hospitalizations  No    patient with   Past Medical History:   Diagnosis Date    Arthritis     Disease of thyroid gland     History of transfusion        No results found for this or any previous visit (from the past 96 hours).     Transthoracic Echo (TTE) Complete    Result Date: 3/7/2024               [facility] [address], [address], [place] [address]         Tel [phone] Fax [phone] TRANSTHORACIC ECHOCARDIOGRAM REPORT  Patient Name:      NEHA STROUD        Reading Physician:    [id number] Ruben Loyola MD Study Date:        3/7/2024              Ordering Provider:    [id number] SHANELL RICHARDSON MRN/PID:           [id number]              Fellow: Accession#:        [id number]          Nurse: Date of Birth/Age: 1950 / 73 years   Sonographer:          Beulah Fallon RDCS, RVT Gender:            F                     Additional Staff: Height:            157.48 cm             Admit Date: Weight:            63.96 kg              Admission Status: BSA / BMI:         1.65 m2 / 25.79 kg/m2 Department Location:  [facility] Blood Pressure: 134 /66 mmHg Study Type:    TRANSTHORACIC ECHO (TTE) COMPLETE Diagnosis/ICD: Presence of prosthetic heart valve-Z95.2 Indication:    #29 Evolut FX TAVR-2/13/2024, CAD, Chest Pain, HTN, 1/6 Systolic                Murmur CPT Codes:     Echo Complete w Full [id number]  Study Detail: The following Echo studies were performed: 2D, M-Mode, Doppler and               color flow.  PHYSICIAN INTERPRETATION: Left Ventricle: Left ventricular systolic  function is normal, with an estimated ejection fraction of 65-70%. There are no regional wall motion abnormalities. The left ventricular cavity size is normal. The left ventricular septal wall thickness is moderately increased. There is mildly increased left ventricular posterior wall thickness. Spectral Doppler shows an impaired relaxation pattern of left ventricular diastolic filling. Left Atrium: The left atrium is moderately dilated. Right Ventricle: The right ventricle is normal in size. There is normal right ventricular global systolic function. Right Atrium: The right atrium is normal in size. Aortic Valve: There is a prosthetic aortic valve present. There is transcatheter aortic valve replacement. Echo findings are consistent with normal aortic valve prosthesis structure and function. There is trivial aortic valve regurgitation. The peak instantaneous gradient of the aortic valve is 17.3 mmHg. The mean gradient of the aortic valve is 8.0 mmHg. Mitral Valve: The mitral valve is mildly thickened. There is trace mitral valve regurgitation. Tricuspid Valve: The tricuspid valve is structurally normal. There is mild tricuspid regurgitation. Pulmonic Valve: The pulmonic valve is not well visualized. There is no indication of pulmonic valve regurgitation. Pericardium: There is no pericardial effusion noted. Aorta: The aortic root is normal.  CONCLUSIONS:  1. Left ventricular systolic function is normal with a 65-70% estimated ejection fraction.  2. Moderately increased left ventricular septal thickness.  3. Spectral Doppler shows an impaired relaxation pattern of left ventricular diastolic filling.  4. The left atrium is moderately dilated.  5. There is a transcatheter aortic valve replacement. QUANTITATIVE DATA SUMMARY: 2D MEASUREMENTS:                           Normal Ranges: Ao Root d:     3.20 cm    (2.0-3.7cm) LAs:           4.10 cm    (2.7-4.0cm) RVIDd:         3.10 cm    (0.9-3.6cm) IVSd:          1.60 cm     (0.6-1.1cm) LVPWd:         1.10 cm    (0.6-1.1cm) LVIDd:         3.90 cm    (3.9-5.9cm) LVIDs:         3.10 cm LV Mass Index: 115.6 g/m2 LV % FS        20.5 % LV SYSTOLIC FUNCTION BY 2D PLANIMETRY (MOD):                     Normal Ranges: EF-A4C View: 50.0 % (>=55%) LV DIASTOLIC FUNCTION:                        Normal Ranges: MV Peak E:    1.05 m/s (0.7-1.2 m/s) MV Peak A:    1.00 m/s (0.42-0.7 m/s) E/A Ratio:    1.05     (1.0-2.2) MV lateral e' 0.09 m/s MV medial e'  0.07 m/s E/e' Ratio:   12.20    (<8.0) MITRAL VALVE:                      Normal Ranges: MV Vmax:    1.18 m/s (<=1.3m/s) MV peak P.6 mmHg (<5mmHg) MV mean P.0 mmHg (<48mmHg) MITRAL INSUFFICIENCY:                      Normal Ranges: MR Vmax: 280.00 cm/s AORTIC VALVE:                                    Normal Ranges: AoV Vmax:                2.08 m/s  (<=1.7m/s) AoV Peak P.3 mmHg (<20mmHg) AoV Mean P.0 mmHg  (1.7-11.5mmHg) LVOT Max Saji:            1.09 m/s  (<=1.1m/s) AoV VTI:                 41.90 cm  (18-25cm) LVOT VTI:                25.70 cm AoV Area, VTI:           4.05 cm2  (2.5-5.5cm2) AoV Area,Vmax:           3.46 cm2  (2.5-4.5cm2) AoV Dimensionless Index: 0.61 AORTIC INSUFFICIENCY: AI Vmax:       2.62 m/s AI Half-time:  469 msec AI Decel Rate: 164.00 cm/s2 TRICUSPID VALVE/RVSP:                             Normal Ranges: Peak TR Velocity: 2.91 m/s RV Syst Pressure: 36.9 mmHg (< 30mmHg) PULMONIC VALVE:                      Normal Ranges: PV Max Saji: 0.5 m/s  (0.6-0.9m/s) PV Max P.2 mmHg  [id number] Ruben Loyola MD Electronically signed on 3/7/2024 at 4:11:17 PM  ** Final **                  Heart Failure Follow up    NYHA class 1    Edema Improved  Dyspnea on Exertion Denies  Fatigue Denies  Exercise Intolerance Denies  Orthopnea Denies  PND Denies    Chest pain No  Syncope No  Palpitations No    All organ systems normal               KCCQ Questionnaire    1  Heart failure affects different people in  different ways. Some feel shortness of breath while others feel fatigue. Please indicate how much you are limited by heart failure (shortness of breath or fatigue) in your ability to do the following activities over the past 2 weeks.     A.) Showering/bathing  5. Not at All  B.) Walking 1 block on level ground 5. Not at All  C.) Hurrying or Jogging   5. Not at All    2.  Over the past 2 weeks, how many times did you have swelling in your feet, ankles or legs when you woke up in the morning? 5. Never    3.  Over the past 2 weeks, on average, how many times has fatigue limited your ability to do what you wanted? 7. Never    4.  Over the past 2 weeks, on average, how many times has shortness of breath limited your ability to do what you wanted? 7. Never    5.  Over the past 2 weeks, on average, how many times have you been forced to sleep sitting up in a chair or with at least 3 pillows to prop you up because of shortness of breath? Never    6. Over the past 2 weeks, how much has your heart failure limited your enjoyment of life? It has not limited my enjoyment of life    7. If you had to spend the rest of your life with your heart failure the way it is right now, how would you feel about this? 5. Completely satisfied    8. How much does your heart failure affect your lifestyle? Please indicate how your heart failure may have limited yourparticipation in the following activities over the past 2 weeks    A.)  Hobbies, recreational activities  5. Did not limit at all    B.) Working or doing household chores  5. Did not limit at all    C.) Visiting family or friends out of your home  5. Did not limit at all      Neha Stroud is a 74 year old Female with a PMH of CAD with PCI to RCA (12/23), hypertension, dyslipidemia, CKD and anemia and Severe aortic stenosis S/p TAVR Evolut FX 29mm with Dr Newberry and Dr Kate 02/13.     Impression:  - 1 year s/p TAVR  - HF symptoms denies any SOB, CP, edema or orthopnea   -  vitals:  /84   - Overall     Pt appears to be euvolemic with flat neck veins and no BLE edema.  Work of breathing normal with NAD, skin tone without pallor.    1 year ECHO - Will Need    Plan:   - Cont ASA for life for post TAVR  - Pt on Brillinta due to PCI to RCA on 12/2023  - Cont current medication regimen  - Cont to increase activity  - No further follow-ups with Structural Heart  - f/u with Dr. Pimentel ([facility]) as scheduled  - Annual ECHO recommended  - SHdental: Life-long Dental SBE prophylaxis needed - Amoxicillin ordered    Virtual Visit     Nini Bellamy MSN, AGACNP-BC  Acute Care Nurse Practitioner   Structural Heart Program  [facility]  Office: [phone]  Fax: [phone]

## 2025-03-26 ENCOUNTER — HOSPITAL ENCOUNTER (OUTPATIENT)
Dept: CARDIOLOGY | Facility: CLINIC | Age: 75
Discharge: HOME | End: 2025-03-26
Payer: MEDICARE

## 2025-03-26 VITALS
DIASTOLIC BLOOD PRESSURE: 56 MMHG | BODY MASS INDEX: 27.05 KG/M2 | SYSTOLIC BLOOD PRESSURE: 118 MMHG | HEIGHT: 62 IN | WEIGHT: 147 LBS

## 2025-03-26 DIAGNOSIS — I35.0 NONRHEUMATIC AORTIC VALVE STENOSIS: ICD-10-CM

## 2025-03-26 DIAGNOSIS — Z95.2 S/P TAVR (TRANSCATHETER AORTIC VALVE REPLACEMENT): ICD-10-CM

## 2025-03-26 PROCEDURE — 93306 TTE W/DOPPLER COMPLETE: CPT

## 2025-03-26 PROCEDURE — 93306 TTE W/DOPPLER COMPLETE: CPT | Performed by: INTERNAL MEDICINE

## 2025-03-27 LAB
AORTIC VALVE MEAN GRADIENT: 6 MMHG
AORTIC VALVE PEAK VELOCITY: 1.66 M/S
AV PEAK GRADIENT: 11 MMHG
AVA (PEAK VEL): 2.67 CM2
AVA (VTI): 3.02 CM2
EJECTION FRACTION APICAL 4 CHAMBER: 67.7
EJECTION FRACTION: 65 %
LEFT ATRIUM VOLUME AREA LENGTH INDEX BSA: 50.4 ML/M2
LEFT VENTRICLE INTERNAL DIMENSION DIASTOLE: 3.88 CM (ref 3.5–6)
LEFT VENTRICULAR OUTFLOW TRACT DIAMETER: 2.23 CM
LV EJECTION FRACTION BIPLANE: 74 %
MITRAL VALVE E/A RATIO: 0.77
MITRAL VALVE E/E' RATIO: 10.11
RIGHT VENTRICLE FREE WALL PEAK S': 9.12 CM/S
RIGHT VENTRICLE PEAK SYSTOLIC PRESSURE: 39.5 MMHG

## 2025-03-28 NOTE — RESULT ENCOUNTER NOTE
No new orders. Please call patient with normal result.  Aortic valve is in great shape, left ventricular function is normal

## 2025-03-31 ENCOUNTER — TELEPHONE (OUTPATIENT)
Dept: CARDIOLOGY | Facility: CLINIC | Age: 75
End: 2025-03-31
Payer: MEDICARE

## 2025-03-31 NOTE — TELEPHONE ENCOUNTER
Result Communication    Resulted Orders   Transthoracic Echo Complete   Result Value Ref Range    AV mn grad 6 mmHg    AV pk erick 1.66 m/s    LV Biplane EF 74 %    LVOT diam 2.23 cm    MV E/A ratio 0.77     MV avg E/e' ratio 10.11     LA vol index A/L 50.4 ml/m2    LV EF 65 %    RV free wall pk S' 9.12 cm/s    RVSP 39.5 mmHg    LVIDd 3.88 cm    Aortic Valve Area by Continuity of Peak Velocity 2.67 cm2    AV pk grad 11 mmHg    Aortic Valve Area by Continuity of VTI 3.02 cm2    LV A4C EF 67.7     Narrative                   [facility]  [address], [address], [place] [address]          Tel [phone] Fax [phone]    TRANSTHORACIC ECHOCARDIOGRAM REPORT    Patient Name:        MIRIAM ZHENG       Reading Physician:    [id number] Jose Dougherty MD,                                                                  [facility]  Study Date:          3/26/2025            Ordering Provider:    [id number] RAY APPLE  MRN/PID:             [id number]             Fellow:  Accession#:          [id number]         Nurse:  Date of Birth/Age:   1950 / 74 years  Sonographer:          Beulah Fallon RDCS, RVT  Gender Assigned at   F                    Additional Staff:  Birth:  Height:              157.48 cm            Admit Date:  Weight:              66.68 kg             Admission Status:  BSA / BMI:           1.68 m2 / 26.89      Department Location:  [facility]                       kg/m2                                      [facility]  Blood Pressure: 118 /56 mmHg    Study Type:    TRANSTHORACIC ECHO (TTE) COMPLETE  Diagnosis/ICD: Nonrheumatic aortic (valve) stenosis-I35.0; Presence of                 prosthetic heart valve-Z95.2  Indication:    #29 Evolut FX TAVR-2/13/2024, CAD, HTN, Hyperlipidemia, MI and                  PTCA-12/2023, History of Syncope, Overweight  CPT Codes:     Echo Complete w Full Doppler-[id number]   Study Detail: The following Echo studies were performed: 2D, M-Mode, Doppler and                color flow.       PHYSICIAN INTERPRETATION:  Left Ventricle: Left ventricular ejection fraction is normal, by visual estimate at 65%. There is severe concentric left ventricular hypertrophy. There are no regional wall motion abnormalities. The left ventricular cavity size is normal. There is mildly increased posterior left ventricular wall thickness. Spectral Doppler shows a normal pattern of left ventricular diastolic filling. Mild basal septal hypertrophy with no resting LVOT gradient.  Left Atrium: The left atrial size is mild to moderately dilated.  Right Ventricle: The right ventricle is normal in size. There is normal right ventricular global systolic function.  Right Atrium: The right atrial size is normal.  Aortic Valve: The aortic valve appears abnormal. The aortic valve dimensionless index is 0.77. There is no evidence of aortic valve regurgitation. The peak instantaneous gradient of the aortic valve is 11 mmHg. The mean gradient of the aortic valve is 6 mmHg. There is a TAVR that appears to be in good position with no stenosis, regurgitation or perivalvular leak.  Mitral Valve: The mitral valve is mildly thickened. The peak instantaneous gradient of the mitral valve is 4 mmHg. There is trace mitral valve regurgitation.  Tricuspid Valve: The tricuspid valve is structurally normal. There is trace to mild tricuspid regurgitation. Estimated RVSP 40 mmHg consistent with mild pulmonary hypertension.  Pulmonic Valve: The pulmonic valve is structurally normal. There is no indication of pulmonic valve regurgitation.  Pericardium: No pericardial effusion noted.  Aorta: The aortic root is normal.  Systemic Veins: The inferior vena cava appears normal in size.  In comparison to the previous echocardiogram(s): When  compared to study from 2/14/2024, the previously reported perivalvular leak around the TAVR is no longer seen.       CONCLUSIONS:   1. Left ventricular ejection fraction is normal, by visual estimate at 65%.   2. Mild basal septal hypertrophy with no resting LVOT gradient.   3. There is normal right ventricular global systolic function.   4. The left atrial size is mild to moderately dilated.   5. Estimated RVSP 40 mmHg consistent with mild pulmonary hypertension.   6. There is a TAVR that appears to be in good position with no stenosis, regurgitation or perivalvular leak.   7. When compared to study from 2/14/2024, the previously reported perivalvular leak around the TAVR is no longer seen.    QUANTITATIVE DATA SUMMARY:     2D MEASUREMENTS:             Normal Ranges:  Ao Root s:       2.80 cm  LAs:             5.25 cm     (2.7-4.0cm)  RVIDd:           3.09 cm     (0.9-3.6cm)  IVSd:            1.80 cm     (0.6-1.1cm)  LVPWd:           1.04 cm     (0.6-1.1cm)  LVIDd:           3.88 cm     (3.9-5.9cm)  LVIDs:           2.57 cm  LV Mass Index:   121.5 g/m2  LVEDV Index:     34.52 ml/m2  LV % FS          33.8 %       LEFT ATRIUM:                 Normal Ranges:  LA Vol A4C:       125.1 ml   (22+/-6mL/m2)  LA Vol A2C:       46.2 ml  LA Vol BP:        84.6 ml  LA Vol Index A4C: 74.6ml/m2  LA Vol Index A2C: 27.5 ml/m2  LA Vol Index BP:  50.4 ml/m2  LA Vol A4C:       116.3 ml  LA Vol A2C:       45.1 ml  LA Vol Index BSA: 48.1 ml/m2       LV SYSTOLIC FUNCTION:                       Normal Ranges:  EF-A4C View:    68 % (>=55%)  EF-A2C View:    73 %  EF-Biplane:     74 %  EF-Visual:      65 %  LV EF Reported: 65 %       LV DIASTOLIC FUNCTION:           Normal Ranges:  MV Peak E:             0.70 m/s  (0.7-1.2 m/s)  MV Peak A:             0.91 m/s  (0.42-0.7 m/s)  E/A Ratio:             0.77      (1.0-2.2)  MV e'                  0.069 m/s (>8.0)  MV lateral e'          0.07 m/s  MV medial e'           0.06 m/s  E/e' Ratio:             10.11     (<8.0)       MITRAL VALVE:          Normal Ranges:  MV Vmax:      0.96 m/s (<=1.3m/s)  MV peak PG:   3.7 mmHg (<5mmHg)  MV mean P.1 mmHg (<48mmHg)  MV VTI:       34.92 cm (10-13cm)  MV DT:        225 msec (150-240msec)       MITRAL INSUFFICIENCY:             Normal Ranges:  MR Vmax:              376.96 cm/s  dP/dt:                562 mmHg/s  (>1200mmHg/sec)       AORTIC VALVE:                      Normal Ranges:  AoV Vmax:                1.66 m/s  (<=1.7m/s)  AoV Peak P.0 mmHg (<20mmHg)  AoV Mean P.8 mmHg  (1.7-11.5mmHg)  LVOT Max Saji:            1.13 m/s  (<=1.1m/s)  AoV VTI:                 33.94 cm  (18-25cm)  LVOT VTI:                26.27 cm  LVOT Diameter:           2.23 cm   (1.8-2.4cm)  AoV Area, VTI:           3.02 cm2  (2.5-5.5cm2)  AoV Area,Vmax:           2.67 cm2  (2.5-4.5cm2)  AoV Dimensionless Index: 0.77       RIGHT VENTRICLE:  RV Basal 4.10 cm  RV Major 5.5 cm  RV s'    0.09 m/s       TRICUSPID VALVE/RVSP:          Normal Ranges:  Peak TR Velocity:     3.02 m/s  RV Syst Pressure:     39 mmHg  (< 30mmHg)       PULMONIC VALVE:          Normal Ranges:  RVOT Vmax:      0.68 m/s (0.6-0.9m/s)       AORTA:  Asc Ao Diam 3.61 cm       [id number] Jose Dougherty MD, [facility]  Electronically signed on 3/27/2025 at 7:55:52 AM         ** Final **         3:25 PM      Results were successfully communicated with the patient and they acknowledged their understanding.

## 2025-03-31 NOTE — TELEPHONE ENCOUNTER
----- Message from Ruben Pimentel sent at 3/28/2025  4:34 PM EDT -----  No new orders. Please call patient with normal result.  Aortic valve is in great shape, left ventricular function is normal

## 2025-04-15 ENCOUNTER — APPOINTMENT (OUTPATIENT)
Dept: CARDIOLOGY | Facility: CLINIC | Age: 75
End: 2025-04-15
Payer: MEDICARE

## 2025-04-15 VITALS
HEIGHT: 62 IN | DIASTOLIC BLOOD PRESSURE: 78 MMHG | WEIGHT: 146.2 LBS | HEART RATE: 84 BPM | SYSTOLIC BLOOD PRESSURE: 110 MMHG | BODY MASS INDEX: 26.91 KG/M2

## 2025-04-15 DIAGNOSIS — E78.2 MIXED HYPERLIPIDEMIA: ICD-10-CM

## 2025-04-15 DIAGNOSIS — I35.0 NONRHEUMATIC AORTIC VALVE STENOSIS: ICD-10-CM

## 2025-04-15 DIAGNOSIS — Z98.61 S/P PTCA (PERCUTANEOUS TRANSLUMINAL CORONARY ANGIOPLASTY): ICD-10-CM

## 2025-04-15 DIAGNOSIS — I25.10 CORONARY ARTERY DISEASE INVOLVING NATIVE CORONARY ARTERY OF NATIVE HEART WITHOUT ANGINA PECTORIS: ICD-10-CM

## 2025-04-15 DIAGNOSIS — Z95.2 S/P TAVR (TRANSCATHETER AORTIC VALVE REPLACEMENT): ICD-10-CM

## 2025-04-15 DIAGNOSIS — I10 ESSENTIAL HYPERTENSION: ICD-10-CM

## 2025-04-15 DIAGNOSIS — Z78.9 NEVER SMOKED TOBACCO: ICD-10-CM

## 2025-04-15 PROCEDURE — 1036F TOBACCO NON-USER: CPT | Performed by: INTERNAL MEDICINE

## 2025-04-15 PROCEDURE — 3074F SYST BP LT 130 MM HG: CPT | Performed by: INTERNAL MEDICINE

## 2025-04-15 PROCEDURE — 99214 OFFICE O/P EST MOD 30 MIN: CPT | Performed by: INTERNAL MEDICINE

## 2025-04-15 PROCEDURE — 3078F DIAST BP <80 MM HG: CPT | Performed by: INTERNAL MEDICINE

## 2025-04-15 PROCEDURE — 1159F MED LIST DOCD IN RCRD: CPT | Performed by: INTERNAL MEDICINE

## 2025-04-15 PROCEDURE — 1160F RVW MEDS BY RX/DR IN RCRD: CPT | Performed by: INTERNAL MEDICINE

## 2025-04-15 RX ORDER — LOSARTAN POTASSIUM 100 MG/1
100 TABLET ORAL DAILY
Qty: 90 TABLET | Refills: 3 | Status: SHIPPED | OUTPATIENT
Start: 2025-04-15 | End: 2026-04-15

## 2025-04-15 RX ORDER — AMLODIPINE BESYLATE 10 MG/1
10 TABLET ORAL
COMMUNITY
Start: 2025-04-11

## 2025-04-15 RX ORDER — LOSARTAN POTASSIUM 25 MG/1
25 TABLET ORAL 2 TIMES DAILY
COMMUNITY
Start: 2024-05-19 | End: 2025-04-15 | Stop reason: SDUPTHER

## 2025-04-15 NOTE — PATIENT INSTRUCTIONS
Please bring all medicines, vitamins, and herbal supplements with you when you come to the office.    Prescriptions will not be filled unless you are compliant with your follow up appointments or have a follow up appointment scheduled as per instruction of your physician. Refills should be requested at the time of your visit.     BMI was above normal measurement. Current weight: 66.3 kg (146 lb 3.2 oz)  Weight change since last visit (-) denotes wt loss -0.8 lbs   Weight loss needed to achieve BMI 25: 9.8 Lbs  Weight loss needed to achieve BMI 30: -17.5 Lbs  Provided instructions on dietary changes  Provided instructions on exercise.

## 2025-04-15 NOTE — PROGRESS NOTES
"Chief Complaint   Patient presents with    Follow-up     8 month, coronary artery disease        Subjective   Neha Stroud is a 75 y.o. female     75-year-old female returns for 8-month follow-up, was recently hospitalized at [facility] with UTI and accelerated/urgent hypertension.  We have none of the [facility] records; however upon discharge she is on amlodipine and losartan as new medications in addition to her lisinopril hydrochlorothiazide (ACE plus ARB), her metoprolol and furosemide.    She remains on DAPT for her history of PCI of the RCA and December 2023.  She has a history of TAVR in February 2024 and is doing well from that standpoint.  She does describe some cognitive impairment following her hypertensive event and UTI.  Will have to find records in order to corroborate exactly what transpired during the hospitalizations; we were never consulted.    Recommendations: Discontinue Brilinta, discontinue lisinopril hydrochlorothiazide (no indication for ACE plus ARB), escalate losartan to 100 daily, follow-up with blood pressure check in 6 weeks with nurse practitioner, I will see her again within the next year         Review of Systems   All other systems reviewed and are negative.           Vitals:    04/15/25 1051   BP: 110/78   BP Location: Left arm   Patient Position: Sitting   Pulse: 84   Weight: 66.3 kg (146 lb 3.2 oz)   Height: 1.575 m (5' 2\")        Objective   Physical Exam  Constitutional:       Appearance: Normal appearance.   HENT:      Nose: Nose normal.   Neck:      Vascular: No carotid bruit.   Cardiovascular:      Rate and Rhythm: Normal rate.      Pulses: Normal pulses.      Heart sounds: Normal heart sounds.   Pulmonary:      Effort: Pulmonary effort is normal.   Abdominal:      General: Bowel sounds are normal.      Palpations: Abdomen is soft.   Musculoskeletal:         General: Normal range of motion.      Cervical back: Normal range of motion.      Right lower leg: No edema.      " Left lower leg: No edema.   Skin:     General: Skin is warm and dry.   Neurological:      General: No focal deficit present.      Mental Status: She is alert.   Psychiatric:         Mood and Affect: Mood normal.         Behavior: Behavior normal.         Thought Content: Thought content normal.         Judgment: Judgment normal.         Allergies  Patient has no known allergies.     Current Medications    Current Outpatient Medications:     acetaminophen (Tylenol) 500 mg tablet, Take 1-2 tablets (500-1,000 mg) by mouth every 6 hours if needed for mild pain (1 - 3)., Disp: , Rfl:     amLODIPine (Norvasc) 10 mg tablet, Take 1 tablet (10 mg) by mouth once daily., Disp: , Rfl:     amoxicillin (Amoxil) 500 mg capsule, Take 4 caps (2000 mg) 30-60mins prior to your dental appointment, Disp: 4 capsule, Rfl: 5    aspirin 81 mg EC tablet, Take 1 tablet (81 mg) by mouth once daily., Disp: , Rfl:     atorvastatin (Lipitor) 40 mg tablet, Take 1 tablet (40 mg) by mouth once daily., Disp: 90 tablet, Rfl: 3    furosemide (Lasix) 20 mg tablet, take 1 tablet by mouth if needed for INCREASED WEIGHT GAIN (3 LBS IN 24 HOURS, 5 LBS IN 48 HOURS), INCREASED LEG SWELLING, AND/OR INCREASED SHORTNESS OF BREATH., Disp: 30 tablet, Rfl: 1    levothyroxine (Synthroid, Levoxyl) 100 mcg tablet, Take 1 tablet (100 mcg) by mouth once daily in the morning. Take before meals., Disp: , Rfl:     losartan (Cozaar) 25 mg tablet, Take 1 tablet (25 mg) by mouth twice a day., Disp: , Rfl:     metoprolol succinate XL (Toprol-XL) 25 mg 24 hr tablet, Take 1 tablet (25 mg) by mouth once daily. do not crush, chew or split, Disp: 90 tablet, Rfl: 3    rizatriptan (Maxalt) 10 mg tablet, Take 1 tablet (10 mg) by mouth once daily as needed for migraine. May repeat in 2 hours if needed, Disp: , Rfl:                      Assessment/Plan   1. Coronary artery disease involving native coronary artery of native heart without angina pectoris  Follow Up In Cardiology      2.  Nonrheumatic aortic valve stenosis        3. S/P TAVR (transcatheter aortic valve replacement)        4. S/P PTCA (percutaneous transluminal coronary angioplasty)        5. Essential hypertension        6. Mixed hyperlipidemia        7. BMI 26.0-26.9,adult        8. Never smoked tobacco                 Scribe Attestation  By signing my name below, IAdamaris LPN, Scribe   attest that this documentation has been prepared under the direction and in the presence of Ruben Pimentel DO.     Provider Attestation - Scribe documentation    All medical record entries made by the Scribe were at my direction and personally dictated by me. I have reviewed the chart and agree that the record accurately reflects my personal performance of the history, physical exam, discussion and plan.

## 2025-04-15 NOTE — LETTER
April 15, 2025     Suleiman Christiansen DO  [address]  [facility] [address]    Patient: Neha Strodu   YOB: 1950   Date of Visit: 4/15/2025       Dear Dr. Suleiman Christiansen, DO:    Thank you for referring Neha Stroud to me for evaluation. Below are my notes for this consultation.  If you have questions, please do not hesitate to call me. I look forward to following your patient along with you.       Sincerely,     Ruben Pimentel,       CC: No Recipients  ______________________________________________________________________________________    Chief Complaint   Patient presents with   • Follow-up     8 month, coronary artery disease        Subjective   Neha Stroud is a 75 y.o. female     75-year-old female returns for 8-month follow-up, was recently hospitalized at [facility] with UTI and accelerated/urgent hypertension.  We have none of the [facility]'s records; however upon discharge she is on amlodipine and losartan as new medications in addition to her lisinopril hydrochlorothiazide (ACE plus ARB), her metoprolol and furosemide.    She remains on DAPT for her history of PCI of the RCA and December 2023.  She has a history of TAVR in February 2024 and is doing well from that standpoint.  She does describe some cognitive impairment following her hypertensive event and UTI.  Will have to find records in order to corroborate exactly what transpired during the hospitalizations; we were never consulted.    Recommendations: Discontinue Brilinta, discontinue lisinopril hydrochlorothiazide (no indication for ACE plus ARB), escalate losartan to 100 daily, follow-up with blood pressure check in 6 weeks with nurse practitioner, I will see her again within the next year         Review of Systems   All other systems reviewed and are negative.           Vitals:    04/15/25 1051   BP: 110/78   BP Location: Left arm   Patient Position: Sitting   Pulse: 84   Weight: 66.3 kg (146 lb 3.2 oz)  "  Height: 1.575 m (5' 2\")        Objective   Physical Exam  Constitutional:       Appearance: Normal appearance.   HENT:      Nose: Nose normal.   Neck:      Vascular: No carotid bruit.   Cardiovascular:      Rate and Rhythm: Normal rate.      Pulses: Normal pulses.      Heart sounds: Normal heart sounds.   Pulmonary:      Effort: Pulmonary effort is normal.   Abdominal:      General: Bowel sounds are normal.      Palpations: Abdomen is soft.   Musculoskeletal:         General: Normal range of motion.      Cervical back: Normal range of motion.      Right lower leg: No edema.      Left lower leg: No edema.   Skin:     General: Skin is warm and dry.   Neurological:      General: No focal deficit present.      Mental Status: She is alert.   Psychiatric:         Mood and Affect: Mood normal.         Behavior: Behavior normal.         Thought Content: Thought content normal.         Judgment: Judgment normal.         Allergies  Patient has no known allergies.     Current Medications    Current Outpatient Medications:   •  acetaminophen (Tylenol) 500 mg tablet, Take 1-2 tablets (500-1,000 mg) by mouth every 6 hours if needed for mild pain (1 - 3)., Disp: , Rfl:   •  amLODIPine (Norvasc) 10 mg tablet, Take 1 tablet (10 mg) by mouth once daily., Disp: , Rfl:   •  amoxicillin (Amoxil) 500 mg capsule, Take 4 caps (2000 mg) 30-60mins prior to your dental appointment, Disp: 4 capsule, Rfl: 5  •  aspirin 81 mg EC tablet, Take 1 tablet (81 mg) by mouth once daily., Disp: , Rfl:   •  atorvastatin (Lipitor) 40 mg tablet, Take 1 tablet (40 mg) by mouth once daily., Disp: 90 tablet, Rfl: 3  •  furosemide (Lasix) 20 mg tablet, take 1 tablet by mouth if needed for INCREASED WEIGHT GAIN (3 LBS IN 24 HOURS, 5 LBS IN 48 HOURS), INCREASED LEG SWELLING, AND/OR INCREASED SHORTNESS OF BREATH., Disp: 30 tablet, Rfl: 1  •  levothyroxine (Synthroid, Levoxyl) 100 mcg tablet, Take 1 tablet (100 mcg) by mouth once daily in the morning. Take before " meals., Disp: , Rfl:   •  losartan (Cozaar) 25 mg tablet, Take 1 tablet (25 mg) by mouth twice a day., Disp: , Rfl:   •  metoprolol succinate XL (Toprol-XL) 25 mg 24 hr tablet, Take 1 tablet (25 mg) by mouth once daily. do not crush, chew or split, Disp: 90 tablet, Rfl: 3  •  rizatriptan (Maxalt) 10 mg tablet, Take 1 tablet (10 mg) by mouth once daily as needed for migraine. May repeat in 2 hours if needed, Disp: , Rfl:                      Assessment/Plan   1. Coronary artery disease involving native coronary artery of native heart without angina pectoris  Follow Up In Cardiology      2. Nonrheumatic aortic valve stenosis        3. S/P TAVR (transcatheter aortic valve replacement)        4. S/P PTCA (percutaneous transluminal coronary angioplasty)        5. Essential hypertension        6. Mixed hyperlipidemia        7. BMI 26.0-26.9,adult        8. Never smoked tobacco                 Scribe Attestation  By signing my name below, IAdamaris LPN, Scribe   attest that this documentation has been prepared under the direction and in the presence of Ruben Pimentel DO.     Provider Attestation - Scribe documentation    All medical record entries made by the Scribe were at my direction and personally dictated by me. I have reviewed the chart and agree that the record accurately reflects my personal performance of the history, physical exam, discussion and plan.

## 2025-04-22 LAB
NON-UH HIE ANION GAP: 10.6 MEQ/L (ref 6–15)
NON-UH HIE BLOOD UREA NITROGEN: 18 MG/DL (ref 7–25)
NON-UH HIE CALCIUM: 9.6 MG/DL (ref 8.6–10.3)
NON-UH HIE CARBON DIOXIDE: 27.9 MMOL/L (ref 21–31)
NON-UH HIE CHLORIDE: 103 MMOL/L (ref 98–107)
NON-UH HIE CREATININE: 0.81 MG/DL (ref 0.6–1.2)
NON-UH HIE ESTIMATED GFR: >60 ML/MIN
NON-UH HIE GLUCOSE: 93 MG/DL (ref 70–100)
NON-UH HIE POTASSIUM: 4.5 MMOL/L (ref 3.5–5.1)
NON-UH HIE SODIUM: 137 MMOL/L (ref 136–145)

## 2025-05-12 ENCOUNTER — TELEPHONE (OUTPATIENT)
Dept: CARDIOLOGY | Facility: CLINIC | Age: 75
End: 2025-05-12
Payer: MEDICARE

## 2025-05-12 NOTE — TELEPHONE ENCOUNTER
Result Communication    Resulted Orders   [facility] Basic Metabolic Panel   Result Value Ref Range    [facility] Glucose 93 70 - 100 mg/dL      Comment:         Random Glucose Reference Range is dependent on time and   content of last meal. Glucose of more than 200 mg/dL in a   nonstressed, ambulatory subject supports the diagnosis of   Diabetes Mellitus.   ADA recommended reference range    [facility] Blood Urea Nitrogen 18 7 - 25 mg/dL    [facility] Creatinine 0.81 0.60 - 1.20 mg/dL    [facility] ESTIMATED GFR >60.0 mL/Min    [facility] Sodium 137 136 - 145 mmol/L    [facility] Potassium 4.5 3.5 - 5.1 mmol/L    [facility] Chloride 103 98 - 107 mmol/L    [facility] Carbon Dioxide 27.9 21.0 - 31.0 mmol/L    [facility] Anion Gap 10.6 6.0 - 15.0 meq/L    [facility] Calcium 9.6 8.6 - 10.3 mg/dL      Comment:      PERFORMED BY:[facility] [place], [place] [id number] MEDICAL DIRECTORSTEPHON PACK M.D.       3:04 PM      Results were successfully communicated with the patient and they acknowledged their understanding.

## 2025-05-12 NOTE — TELEPHONE ENCOUNTER
----- Message from Ruben Pimentel sent at 5/12/2025  2:54 PM EDT -----  No new orders. Please call patient with normal result.  ----- Message -----  From: Ni Walker - Lab Results In  Sent: 4/22/2025  10:02 AM EDT  To: Ruben Pimentel, DO

## 2025-05-28 ENCOUNTER — APPOINTMENT (OUTPATIENT)
Dept: CARDIOLOGY | Facility: CLINIC | Age: 75
End: 2025-05-28
Payer: MEDICARE

## 2025-05-28 VITALS
HEIGHT: 62 IN | HEART RATE: 78 BPM | WEIGHT: 150 LBS | SYSTOLIC BLOOD PRESSURE: 124 MMHG | DIASTOLIC BLOOD PRESSURE: 86 MMHG | BODY MASS INDEX: 27.6 KG/M2

## 2025-05-28 DIAGNOSIS — I10 ESSENTIAL HYPERTENSION: ICD-10-CM

## 2025-05-28 PROCEDURE — 3079F DIAST BP 80-89 MM HG: CPT | Performed by: NURSE PRACTITIONER

## 2025-05-28 PROCEDURE — 3074F SYST BP LT 130 MM HG: CPT | Performed by: NURSE PRACTITIONER

## 2025-05-28 PROCEDURE — 1159F MED LIST DOCD IN RCRD: CPT | Performed by: NURSE PRACTITIONER

## 2025-05-28 PROCEDURE — 1160F RVW MEDS BY RX/DR IN RCRD: CPT | Performed by: NURSE PRACTITIONER

## 2025-05-28 PROCEDURE — 99212 OFFICE O/P EST SF 10 MIN: CPT | Performed by: NURSE PRACTITIONER

## 2025-05-28 NOTE — PATIENT INSTRUCTIONS
Please bring all medicines, vitamins, and herbal supplements with you when you come to the office.    Prescriptions will not be filled unless you are compliant with your follow up appointments or have a follow up appointment scheduled as per instruction of your physician. Refills should be requested at the time of your visit.     PLAN:   Through informed decision making process incorporating patients unique circumstances, the following treatment plan will be initiated:    1.  Prescription drug management of cardiovascular medication for efficacy, adherence to treatment, side effect assessment and polypharmacy. Current treatment clinically warranted and to continue without modifications.    2. Return for follow-up; in the interim, contact the office if new symptoms arise.  Dr. Pimentel as scheduled   English

## 2025-05-28 NOTE — LETTER
May 29, 2025     Suleiman Christiansen DO  [address]  [place] [address]    Patient: Neha Stroud   YOB: 1950   Date of Visit: 5/28/2025       Dear Dr. Suleiman Christiansen, :    Thank you for referring Neha Stroud to me for evaluation. Below are my notes for this consultation.  If you have questions, please do not hesitate to call me. I look forward to following your patient along with you.       Sincerely,     Marline Aleman, APRN-CNP      CC: No Recipients  ______________________________________________________________________________________    Subjective:   Neha Stroud is a 75 y.o. female with hypertension.  The office ambulatory with steady gait.  Last evaluated by Dr. Pimentel April 2025.  At that time, Brilinta was discontinued.  Lisinopril/hydrochlorothiazide was discontinued and losartan increased to 100 mg daily.  Repeat potassium 4.5, creatinine 0.81.    Patient presents to the office where she continues to follow her blood pressure at home with systolic blood pressures consistently below 130.  She does report that initially off of the hydrochlorothiazide she had to use Lasix as needed for a couple days due to lower extremity edema.  Discussed side effect of Norvasc.  She will notify the office of any additional concerns.    Otherwise, patient denies any change in overall cardiovascular status since last evaluation in clinic.      Current Outpatient Medications   Medication Instructions   • acetaminophen (Tylenol) 500 mg tablet 1-2 tablets, Every 6 hours PRN   • amLODIPine (NORVASC) 10 mg, Daily RT   • amoxicillin (Amoxil) 500 mg capsule Take 4 caps (2000 mg) 30-60mins prior to your dental appointment   • aspirin 81 mg, Daily   • atorvastatin (LIPITOR) 40 mg, oral, Daily   • furosemide (Lasix) 20 mg tablet take 1 tablet by mouth if needed for INCREASED WEIGHT GAIN (3 LBS IN 24 HOURS, 5 LBS IN 48 HOURS), INCREASED LEG SWELLING, AND/OR INCREASED SHORTNESS OF BREATH.   •  "levothyroxine (SYNTHROID, LEVOXYL) 100 mcg, Daily before breakfast   • losartan (COZAAR) 100 mg, oral, Daily   • metoprolol succinate XL (TOPROL-XL) 25 mg, oral, Daily, do not crush, chew or split   • rizatriptan (MAXALT) 10 mg, Daily PRN         Hypertension ROS: taking medications as instructed, no medication side effects noted, no TIA's, no chest pain on exertion, no dyspnea on exertion, and no swelling of ankles.   New concerns: none.     Objective:   /86 (BP Location: Left arm, Patient Position: Sitting)   Pulse 78   Ht 1.575 m (5' 2\")   Wt 68 kg (150 lb)   LMP  (LMP Unknown)   BMI 27.44 kg/m²    Appearance alert, well appearing, and in no distress.  General exam BP noted to be well controlled today in office, S1, S2 normal, no gallop, no murmur, chest clear, no JVD, no HSM, no edema.   Lab review: labs are reviewed, up to date and normal.     Assessment:    Hypertension well controlled.     Plan:   Reviewed diet, exercise and weight control.  Recommended sodium restriction.  Previously scheduled follow-up with Dr. Concepcion.    Marline Aleman MSN, APRN-CNP, PMHNP-[facility] [place]  [place], [place]     Please excuse any errors in grammar or translation related to this dictation. Voice recognition software was utilized to prepare this document.      "

## 2025-05-29 NOTE — PROGRESS NOTES
"Subjective:   Neha Stroud is a 75 y.o. female with hypertension.  The office ambulatory with steady gait.  Last evaluated by Dr. Pimentel April 2025.  At that time, Brilinta was discontinued.  Lisinopril/hydrochlorothiazide was discontinued and losartan increased to 100 mg daily.  Repeat potassium 4.5, creatinine 0.81.    Patient presents to the office where she continues to follow her blood pressure at home with systolic blood pressures consistently below 130.  She does report that initially off of the hydrochlorothiazide she had to use Lasix as needed for a couple days due to lower extremity edema.  Discussed side effect of Norvasc.  She will notify the office of any additional concerns.    Otherwise, patient denies any change in overall cardiovascular status since last evaluation in clinic.      Current Outpatient Medications   Medication Instructions    acetaminophen (Tylenol) 500 mg tablet 1-2 tablets, Every 6 hours PRN    amLODIPine (NORVASC) 10 mg, Daily RT    amoxicillin (Amoxil) 500 mg capsule Take 4 caps (2000 mg) 30-60mins prior to your dental appointment    aspirin 81 mg, Daily    atorvastatin (LIPITOR) 40 mg, oral, Daily    furosemide (Lasix) 20 mg tablet take 1 tablet by mouth if needed for INCREASED WEIGHT GAIN (3 LBS IN 24 HOURS, 5 LBS IN 48 HOURS), INCREASED LEG SWELLING, AND/OR INCREASED SHORTNESS OF BREATH.    levothyroxine (SYNTHROID, LEVOXYL) 100 mcg, Daily before breakfast    losartan (COZAAR) 100 mg, oral, Daily    metoprolol succinate XL (TOPROL-XL) 25 mg, oral, Daily, do not crush, chew or split    rizatriptan (MAXALT) 10 mg, Daily PRN         Hypertension ROS: taking medications as instructed, no medication side effects noted, no TIA's, no chest pain on exertion, no dyspnea on exertion, and no swelling of ankles.   New concerns: none.     Objective:   /86 (BP Location: Left arm, Patient Position: Sitting)   Pulse 78   Ht 1.575 m (5' 2\")   Wt 68 kg (150 lb)   LMP  (LMP Unknown)  "  BMI 27.44 kg/m²    Appearance alert, well appearing, and in no distress.  General exam BP noted to be well controlled today in office, S1, S2 normal, no gallop, no murmur, chest clear, no JVD, no HSM, no edema.   Lab review: labs are reviewed, up to date and normal.     Assessment:    Hypertension well controlled.     Plan:   Reviewed diet, exercise and weight control.  Recommended sodium restriction.  Previously scheduled follow-up with Dr. Concepcion.    Marline Aleman MSN, APRN-CNP, PMHNP-[facility] [place]  [place], [place]     Please excuse any errors in grammar or translation related to this dictation. Voice recognition software was utilized to prepare this document.

## 2026-04-16 ENCOUNTER — APPOINTMENT (OUTPATIENT)
Dept: CARDIOLOGY | Facility: CLINIC | Age: 76
End: 2026-04-16
Payer: MEDICARE

## (undated) DEVICE — CATHETER, ANGIO, IMPULSE, PIG 155, 6 FR X 110 CM

## (undated) DEVICE — ACCESS KIT, MINI MAK, 4 FR X 10CM, 0.018 X 40CM, NITINOL/PLATINUM, ECHO TIP

## (undated) DEVICE — INTRODUCER SHEATH, SENTRANT ENSURE SEAL 14FR 28CM

## (undated) DEVICE — INTRODUCER, SHEATH, FAST-CATH, 7 FR X 23 CM

## (undated) DEVICE — GUIDEWIRE, SAFARI2, EXTRA SUPPORT, EXTRA SMALL CURVE

## (undated) DEVICE — GUIDEWIRE, INQWIRE, 3MM J, .035, 150

## (undated) DEVICE — CATHETER, ANGIO, IMPULSE, FR4, 6 FR X 100 CM

## (undated) DEVICE — SHEATH, INTRODUCER, PRELUDE, 6FR 11CML, W/MINI ACCESS KIT

## (undated) DEVICE — DELIVERY SYSTEM, EVOLUT FX, 23-29MM

## (undated) DEVICE — CATHETER, PACING, PACEL, FLOW DIRECTED, 5 FR X 110 CM

## (undated) DEVICE — GUIDEWIRE, INQWIRE, 3MM J, .035, 260

## (undated) DEVICE — CABLE, PACING PATIENT BIPOLAR 8'

## (undated) DEVICE — CATHETER, ANGIO, IMPULSE, PIGTAIL, 6 FR X 110 CM

## (undated) DEVICE — GUIDEWIRE, INQWIRE, .035 X 180CM, STRT

## (undated) DEVICE — SLEEVE, REPOSITIONING, W/C-LOCK ADAPTER, 60 CM

## (undated) DEVICE — DEVICE, CLOSURE, PERCLOSE, PROSTYLE

## (undated) DEVICE — SHEATH, INTRODUCER, HYDROPHILIC, PRELUDE IDEAL, 7FR, 11CM

## (undated) DEVICE — CATHETER, BALLOON DILATION, LOMA VISTA, 20MM X 4.5CM